# Patient Record
Sex: FEMALE | Race: WHITE | NOT HISPANIC OR LATINO | Employment: OTHER | URBAN - NONMETROPOLITAN AREA
[De-identification: names, ages, dates, MRNs, and addresses within clinical notes are randomized per-mention and may not be internally consistent; named-entity substitution may affect disease eponyms.]

---

## 2019-02-05 ENCOUNTER — OFFICE VISIT (OUTPATIENT)
Dept: CARDIOLOGY | Facility: CLINIC | Age: 69
End: 2019-02-05

## 2019-02-05 VITALS
HEART RATE: 118 BPM | SYSTOLIC BLOOD PRESSURE: 160 MMHG | WEIGHT: 172.2 LBS | BODY MASS INDEX: 25.51 KG/M2 | OXYGEN SATURATION: 98 % | HEIGHT: 69 IN | DIASTOLIC BLOOD PRESSURE: 108 MMHG

## 2019-02-05 DIAGNOSIS — R06.02 SOB (SHORTNESS OF BREATH): ICD-10-CM

## 2019-02-05 DIAGNOSIS — R00.0 TACHYCARDIA: ICD-10-CM

## 2019-02-05 DIAGNOSIS — I10 ESSENTIAL HYPERTENSION: Primary | ICD-10-CM

## 2019-02-05 DIAGNOSIS — R00.2 PALPITATIONS: ICD-10-CM

## 2019-02-05 PROCEDURE — 93000 ELECTROCARDIOGRAM COMPLETE: CPT | Performed by: PHYSICIAN ASSISTANT

## 2019-02-05 PROCEDURE — 99204 OFFICE O/P NEW MOD 45 MIN: CPT | Performed by: PHYSICIAN ASSISTANT

## 2019-02-05 RX ORDER — LISINOPRIL 20 MG/1
20 TABLET ORAL DAILY
COMMUNITY
End: 2019-03-07 | Stop reason: SDUPTHER

## 2019-02-05 RX ORDER — AMLODIPINE BESYLATE 10 MG/1
10 TABLET ORAL DAILY
COMMUNITY
End: 2019-03-07 | Stop reason: SDUPTHER

## 2019-02-05 RX ORDER — METOPROLOL TARTRATE 50 MG/1
50 TABLET, FILM COATED ORAL 2 TIMES DAILY
Qty: 180 TABLET | Refills: 3 | Status: SHIPPED | OUTPATIENT
Start: 2019-02-05 | End: 2020-03-16

## 2019-02-05 NOTE — PATIENT INSTRUCTIONS

## 2019-02-05 NOTE — PROGRESS NOTES
Subjective   Katharine Becerra is a 69 y.o. female     Chief Complaint   Patient presents with   • [facility] Care     patient appears in office today to [facility] cardiac care   • Hypertension       HPI  The patient presents today for initial evaluation.  She has a complex past medical history.  Her initial issues and complications started in roughly 2017.  At that time she was hospitalized at  in the setting of endometrial cancer.  She apparently had an episode of SVT at that time requiring, by description, adenosine.  She eventually had a stress test and an echo at that time which was unremarkable.  She had several other issues as well.  Also noted was a suspected her questionable DVT.  She apparently was treated with low molecular weight heparin but did not receive long-term anticoagulation otherwise.  She was placed on beta blocker because of her SVT.  She was then monitored as an outpatient with an event monitor.  That was unremarkable.  She has done well since.  Her biggest issue since has been recurrent hypertensive episodes.  She will have tachycardic episodes at that time as well.  She tells me that she has always been told that she was hypertensive and tachycardic.  She has had no evaluation for that.  She has been steadily increased and her antihypertensive regimen.  She now takes amlodipine, lisinopril, metoprolol.  She remains hypertensive and tachycardic, even today.  Apparently, previous labs including chemistry panel and thyroid studies have been unremarkable.  She did see her primary care provider recently where she was again hypertensive and tachycardic.  She has now been referred up to us for evaluation.  Symptomatically, she seems to do well.  She has dyspnea.  She has no chest pain.  She has no dizziness or syncope.  She has no further complaints otherwise.      Current Outpatient Medications   Medication Sig Dispense Refill   • amLODIPine (NORVASC) 10 MG tablet Take 10 mg by mouth Daily.     • lisinopril  (PRINIVIL,ZESTRIL) 20 MG tablet Take 20 mg by mouth Daily.     • metoprolol tartrate (LOPRESSOR) 50 MG tablet Take 1 tablet by mouth 2 (Two) Times a Day. 180 tablet 3     No current facility-administered medications for this visit.        Patient has no known allergies.    Past Medical History:   Diagnosis Date   • Cancer (CMS/HCC)     endometrial CA   • Hypertension    • Tachycardia        Social History     Socioeconomic History   • Marital status:      Spouse name: Not on file   • Number of children: Not on file   • Years of education: Not on file   • Highest education level: Not on file   Social Needs   • Financial resource strain: Not on file   • Food insecurity - worry: Not on file   • Food insecurity - inability: Not on file   • Transportation needs - medical: Not on file   • Transportation needs - non-medical: Not on file   Occupational History   • Not on file   Tobacco Use   • Smoking status: Never Smoker   • Smokeless tobacco: Never Used   Substance and Sexual Activity   • Alcohol use: No     Frequency: Never   • Drug use: No   • Sexual activity: Defer   Other Topics Concern   • Not on file   Social History Narrative   • Not on file       Family History   Problem Relation Age of Onset   • Cancer Mother    • Heart attack Father        Review of Systems   Constitutional: Negative.  Negative for fatigue.   HENT: Negative.  Negative for congestion, rhinorrhea and sneezing.    Eyes: Positive for visual disturbance (PRN ).   Respiratory: Positive for shortness of breath (SOA on exertion only). Negative for apnea, cough, chest tightness and wheezing.    Cardiovascular: Negative.  Negative for chest pain, palpitations and leg swelling.   Gastrointestinal: Negative.  Negative for abdominal pain, nausea and vomiting.   Endocrine: Negative.  Negative for cold intolerance and heat intolerance.   Genitourinary: Negative.  Negative for difficulty urinating, frequency and urgency.   Musculoskeletal: Negative.   "Negative for arthralgias, back pain, myalgias, neck pain and neck stiffness.   Skin: Negative.  Negative for rash and wound.   Allergic/Immunologic: Negative.  Negative for environmental allergies and food allergies.   Neurological: Negative.  Negative for dizziness, syncope, weakness, light-headedness and headaches.   Hematological: Negative.  Does not bruise/bleed easily.   Psychiatric/Behavioral: Negative.  Negative for agitation, confusion and sleep disturbance (denies waking up smothering/SOA). The patient is not nervous/anxious.        Objective     Vitals:    02/05/19 1450   BP: (!) 160/108   BP Location: Left arm   Patient Position: Sitting   Pulse: 118   SpO2: 98%   Weight: 78.1 kg (172 lb 3.2 oz)   Height: 175.3 cm (69\")        BP (!) 160/108 (BP Location: Left arm, Patient Position: Sitting)   Pulse 118   Ht 175.3 cm (69\")   Wt 78.1 kg (172 lb 3.2 oz)   SpO2 98%   BMI 25.43 kg/m²      Lab Results (most recent)     None          Physical Exam   Constitutional: She is oriented to person, place, and time. She appears well-developed and well-nourished. No distress.   HENT:   Head: Normocephalic and atraumatic.   Eyes: Conjunctivae and EOM are normal. Pupils are equal, round, and reactive to light.   Neck: Normal range of motion. Neck supple. No JVD present. No tracheal deviation present.   Cardiovascular: Normal rate, regular rhythm, normal heart sounds and intact distal pulses.   Pulmonary/Chest: Effort normal and breath sounds normal.   Abdominal: Soft. Bowel sounds are normal. She exhibits no distension and no mass. There is no tenderness. There is no rebound and no guarding.   Musculoskeletal: Normal range of motion. She exhibits no edema, tenderness or deformity.   Neurological: She is alert and oriented to person, place, and time.   Skin: Skin is warm and dry. No rash noted. No erythema. No pallor.   Psychiatric: She has a normal mood and affect. Her behavior is normal. Judgment and thought content " normal.   Nursing note and vitals reviewed.      Procedure     ECG 12 Lead  Date/Time: 2/5/2019 3:05 PM  Performed by: Thad Coyle PA  Authorized by: Thad Coyle PA   Comments: HTN    Probable sinus tachycardia at 113, no acute changes noted.                 Assessment/Plan      Diagnosis Plan   1. Essential hypertension  ECG 12 Lead    CT angiogram renal    Overnight Sleep Oximetry Study    Catecholamines, Fractionated, Urine, 24 Hour - Urine, Clean Catch    Metanephrines, Urine, 24 Hour - Urine, Clean Catch   2. SOB (shortness of breath)  CT angiogram renal    Overnight Sleep Oximetry Study    Catecholamines, Fractionated, Urine, 24 Hour - Urine, Clean Catch    Metanephrines, Urine, 24 Hour - Urine, Clean Catch   3. Tachycardia  CT angiogram renal    Overnight Sleep Oximetry Study    Catecholamines, Fractionated, Urine, 24 Hour - Urine, Clean Catch    Metanephrines, Urine, 24 Hour - Urine, Clean Catch   4. Palpitations       I feel the patient needs further evaluation for secondary causes of her hypertension.  Also with her tachycardia, she needs evaluation with 24 urine for catecholamines and metanephrines.  She will need a CTA of the renal arteries.  She will also be scheduled for overnight oximetry given her hypertensive excursions and intermittent fatigue otherwise.  I recommended her to consider an echo and an event monitor.  Because of studies were unremarkable within the past year and a half to 2 years to , she does not want to repeat those.  She had recent laboratories including chemistry panels and thyroid which apparently was unremarkable.  I will not repeat those.  Because of ongoing hypertension and tachycardia, I feel that we need to increase metoprolol to 50 mg twice a day.  I reviewed this with her.  She'll monitor heart rate and blood pressures very closely home and call us for any complications.  We can see her back after the above and recommend further at that time.  She will call  for any issues prior to follow-up.           Patient's Body mass index is 25.43 kg/m². BMI is above normal parameters. Recommendations include: educational material and referral to primary care.           Electronically signed by:

## 2019-03-05 ENCOUNTER — LAB (OUTPATIENT)
Dept: LAB | Facility: HOSPITAL | Age: 69
End: 2019-03-05

## 2019-03-05 DIAGNOSIS — R00.0 TACHYCARDIA: ICD-10-CM

## 2019-03-05 DIAGNOSIS — I10 ESSENTIAL HYPERTENSION: ICD-10-CM

## 2019-03-05 DIAGNOSIS — R06.02 SOB (SHORTNESS OF BREATH): ICD-10-CM

## 2019-03-05 PROCEDURE — 81050 URINALYSIS VOLUME MEASURE: CPT | Performed by: PHYSICIAN ASSISTANT

## 2019-03-05 PROCEDURE — 82384 ASSAY THREE CATECHOLAMINES: CPT | Performed by: PHYSICIAN ASSISTANT

## 2019-03-05 PROCEDURE — 83835 ASSAY OF METANEPHRINES: CPT | Performed by: PHYSICIAN ASSISTANT

## 2019-03-07 RX ORDER — LISINOPRIL 20 MG/1
20 TABLET ORAL DAILY
Qty: 30 TABLET | Refills: 6 | Status: SHIPPED | OUTPATIENT
Start: 2019-03-07 | End: 2019-10-21 | Stop reason: SDUPTHER

## 2019-03-07 RX ORDER — AMLODIPINE BESYLATE 10 MG/1
10 TABLET ORAL DAILY
Qty: 30 TABLET | Refills: 6 | Status: SHIPPED | OUTPATIENT
Start: 2019-03-07 | End: 2020-01-30 | Stop reason: SDUPTHER

## 2019-03-09 LAB
METANEPHS 24H UR-MRATE: 56 UG/24 HR (ref 45–290)
METANEPHS UR-MCNC: 34 UG/L
NORMETANEPHRINE 24H UR-MCNC: 248 UG/L
NORMETANEPHRINE 24H UR-MRATE: 409 UG/24 HR (ref 82–500)

## 2019-03-11 LAB
DOPAMINE 24H UR-MRATE: 155 UG/24 HR (ref 0–510)
DOPAMINE UR-MCNC: 91 UG/L
EPINEPH 24H UR-MRATE: 2 UG/24 HR (ref 0–20)
EPINEPH UR-MCNC: 1 UG/L
NOREPINEPH 24H UR-MRATE: 53 UG/24 HR (ref 0–135)
NOREPINEPH UR-MCNC: 31 UG/L

## 2019-07-30 ENCOUNTER — OFFICE VISIT (OUTPATIENT)
Dept: CARDIOLOGY | Facility: CLINIC | Age: 69
End: 2019-07-30

## 2019-07-30 VITALS
DIASTOLIC BLOOD PRESSURE: 77 MMHG | WEIGHT: 166.8 LBS | HEART RATE: 54 BPM | BODY MASS INDEX: 24.71 KG/M2 | HEIGHT: 69 IN | OXYGEN SATURATION: 98 % | SYSTOLIC BLOOD PRESSURE: 146 MMHG

## 2019-07-30 DIAGNOSIS — R06.02 SOB (SHORTNESS OF BREATH): Primary | ICD-10-CM

## 2019-07-30 DIAGNOSIS — I10 ESSENTIAL HYPERTENSION: ICD-10-CM

## 2019-07-30 DIAGNOSIS — R00.0 TACHYCARDIA: ICD-10-CM

## 2019-07-30 PROCEDURE — 99213 OFFICE O/P EST LOW 20 MIN: CPT | Performed by: PHYSICIAN ASSISTANT

## 2019-07-30 NOTE — PROGRESS NOTES
Problem list     Subjective   Katharine Becerra is a 69 y.o. female     Chief Complaint   Patient presents with   • Hypertension   • Rapid Heart Rate       HPI  The patient presents in today for follow-up.  We had seen her to establish care at last evaluation.  She had an extensive hospitalization at  previously as outlined in her initial note.  Irregardless, she was diagnosed with SVT at that time.  She started on low-dose beta-blocker.  She tells me that she had a stress test and an echo study which were unremarkable.  Because of ongoing hypertension and tachycardia we did see her to establish care at last evaluation.  We recommended consideration for repeat echo and event monitor.  As she had a previous study which were read as unremarkable including the echo and event monitor, she did not want to pursue that.  We increased metoprolol to 50 mg twice a day.  We recommended CTA of the renal arteries and also 24 urine for catecholamines/metanephrines.  She canceled the CTA of the renals and does not want to pursue that.  24 urine for catecholamines/metanephrines were normal.    Since being on metoprolol, blood pressures have basically normalized.  Heart rates have normalized as well.  She has had no further episodes which would suggest SVT.  Currently, the patient has no chest pain.  She has stable dyspnea.  She has no PND orthopnea.  Patient has no further complaints otherwise.    Current Outpatient Medications   Medication Sig Dispense Refill   • amLODIPine (NORVASC) 10 MG tablet Take 1 tablet by mouth Daily. 30 tablet 6   • lisinopril (PRINIVIL,ZESTRIL) 20 MG tablet Take 1 tablet by mouth Daily. 30 tablet 6   • metoprolol tartrate (LOPRESSOR) 50 MG tablet Take 1 tablet by mouth 2 (Two) Times a Day. 180 tablet 3     No current facility-administered medications for this visit.        Patient has no known allergies.    Past Medical History:   Diagnosis Date   • Cancer (CMS/HCC)     endometrial CA   • Hypertension    •  "Tachycardia        Social History     Socioeconomic History   • Marital status:      Spouse name: Not on file   • Number of children: Not on file   • Years of education: Not on file   • Highest education level: Not on file   Tobacco Use   • Smoking status: Never Smoker   • Smokeless tobacco: Never Used   Substance and Sexual Activity   • Alcohol use: No     Frequency: Never   • Drug use: No   • Sexual activity: Defer       Family History   Problem Relation Age of Onset   • Cancer Mother    • Heart attack Father        Review of Systems   Constitutional: Negative.  Negative for chills, fatigue and fever.   HENT: Negative.  Negative for congestion, rhinorrhea and sore throat.    Eyes: Positive for visual disturbance (glasses prn ).   Respiratory: Negative.  Negative for chest tightness and shortness of breath.    Cardiovascular: Positive for leg swelling (Some puffiness, but improved ). Negative for chest pain and palpitations.   Gastrointestinal: Negative.  Negative for abdominal pain, blood in stool, nausea and vomiting.   Endocrine: Negative.  Negative for cold intolerance and heat intolerance.   Genitourinary: Negative.  Negative for dysuria, frequency, hematuria and urgency.   Musculoskeletal: Negative.  Negative for arthralgias and back pain.   Skin: Negative.  Negative for rash and wound.   Allergic/Immunologic: Negative.  Negative for environmental allergies and food allergies.   Neurological: Negative.  Negative for dizziness, weakness and light-headedness.   Hematological: Negative.  Does not bruise/bleed easily.   Psychiatric/Behavioral: Negative.  Negative for agitation, confusion and sleep disturbance (Denies waking with smothering or SOA). The patient is not nervous/anxious.        Objective   Vitals:    07/30/19 1002   BP: 146/77   BP Location: Left arm   Patient Position: Sitting   Pulse: 54   SpO2: 98%   Weight: 75.7 kg (166 lb 12.8 oz)   Height: 175.3 cm (69\")      /77 (BP Location: Left " "arm, Patient Position: Sitting)   Pulse 54   Ht 175.3 cm (69\")   Wt 75.7 kg (166 lb 12.8 oz)   SpO2 98%   BMI 24.63 kg/m²    Lab Results (most recent)     None        Physical Exam   Constitutional: She is oriented to person, place, and time. She appears well-developed and well-nourished. No distress.   HENT:   Head: Normocephalic and atraumatic.   Eyes: Conjunctivae and EOM are normal. Pupils are equal, round, and reactive to light.   Neck: Normal range of motion. Neck supple. No JVD present. No tracheal deviation present.   Cardiovascular: Normal rate, regular rhythm, normal heart sounds and intact distal pulses.   Pulmonary/Chest: Effort normal and breath sounds normal.   Abdominal: Soft. Bowel sounds are normal. She exhibits no distension and no mass. There is no tenderness. There is no rebound and no guarding.   Musculoskeletal: Normal range of motion. She exhibits no edema, tenderness or deformity.   Neurological: She is alert and oriented to person, place, and time.   Skin: Skin is warm and dry. No rash noted. No erythema. No pallor.   Psychiatric: She has a normal mood and affect. Her behavior is normal. Judgment and thought content normal.   Nursing note and vitals reviewed.        Procedure   Procedures       Assessment/Plan      Diagnosis Plan   1. SOB (shortness of breath)     2. Essential hypertension     3. Tachycardia       1.  Previous work-up has been very much benign.  She tells me that her previous stress and echo studies were unremarkable.  Her dyspnea at this time seems to be at baseline.  She has had no further chest pain.  I feel no further cardiac work-up is warranted at this time.    2.  There is also reported history of SVT, the documented through [facility] records.  We have requested those.  At this time however I have increased metoprolol down to 50 mg twice a day.  With increasing the therapy, the patient reports that she is now normotensive for the most part has had no further episodes of " tachycardia/palpitations.  She certainly has nothing to suggest SVT.  We did discuss physical maneuvers and lifestyle modifications otherwise given questionable history of SVT.    3.  Her 24 urine for catecholamines and metanephrines were unremarkable.  We had recommended CTA of the renals.  The patient canceled that he does not want to pursue that.  As she has responded so well to the increase of metoprolol therapy and is now normotensive, I feel that we can hold on that for now.  We can revisit that in the future if felt applicable.    4.  In the setting, with benign work-up and now stable setting particularly with regards to heart rate blood pressures, I feel nothing further is indicated.  We will continue to see this patient on 6-month intervals.  She will call for any issues prior to follow-up.         Patient's Body mass index is 24.63 kg/m². BMI is within normal parameters. No follow-up required..             Electronically signed by:

## 2019-10-21 DIAGNOSIS — I10 ESSENTIAL HYPERTENSION: Primary | ICD-10-CM

## 2019-10-21 RX ORDER — LISINOPRIL 20 MG/1
20 TABLET ORAL DAILY
Qty: 30 TABLET | Refills: 5 | Status: SHIPPED | OUTPATIENT
Start: 2019-10-21 | End: 2020-01-30 | Stop reason: SDUPTHER

## 2020-01-30 ENCOUNTER — OFFICE VISIT (OUTPATIENT)
Dept: CARDIOLOGY | Facility: CLINIC | Age: 70
End: 2020-01-30

## 2020-01-30 VITALS
WEIGHT: 165 LBS | BODY MASS INDEX: 24.44 KG/M2 | OXYGEN SATURATION: 99 % | HEIGHT: 69 IN | HEART RATE: 60 BPM | SYSTOLIC BLOOD PRESSURE: 153 MMHG | DIASTOLIC BLOOD PRESSURE: 73 MMHG

## 2020-01-30 DIAGNOSIS — I10 ESSENTIAL HYPERTENSION: ICD-10-CM

## 2020-01-30 DIAGNOSIS — R00.2 PALPITATIONS: ICD-10-CM

## 2020-01-30 DIAGNOSIS — R00.0 TACHYCARDIA: ICD-10-CM

## 2020-01-30 DIAGNOSIS — R06.02 SOB (SHORTNESS OF BREATH): ICD-10-CM

## 2020-01-30 PROCEDURE — 99213 OFFICE O/P EST LOW 20 MIN: CPT | Performed by: PHYSICIAN ASSISTANT

## 2020-01-30 PROCEDURE — 93000 ELECTROCARDIOGRAM COMPLETE: CPT | Performed by: PHYSICIAN ASSISTANT

## 2020-01-30 RX ORDER — LISINOPRIL 20 MG/1
20 TABLET ORAL DAILY
Qty: 90 TABLET | Refills: 3 | Status: SHIPPED | OUTPATIENT
Start: 2020-01-30 | End: 2020-07-29 | Stop reason: SDUPTHER

## 2020-01-30 RX ORDER — AMLODIPINE BESYLATE 10 MG/1
10 TABLET ORAL DAILY
Qty: 90 TABLET | Refills: 3 | Status: SHIPPED | OUTPATIENT
Start: 2020-01-30 | End: 2020-07-29 | Stop reason: SDUPTHER

## 2020-01-30 NOTE — PROGRESS NOTES
Problem list     Subjective   Katharine Becerra is a 70 y.o. female     Chief Complaint   Patient presents with   • Hypertension     Here for a follow up        HPI  The patient presents in today to follow-up routinely.  The patient apparently has had a history of SVT in the past.  That has been treated with metoprolol therapy.  We have slowly titrated that regimen up for treatment of palpitations/tachycardia but also for additional blood pressure control.  Per report, the patient had a previous evaluation through [facility] with a stress and an echo.  Apparently there was an evaluation shortly thereafter with an event monitor.  Those studies were all benign.  The patient really has done well since her last evaluation here.  Blood pressures been mostly controlled.  She reports no dysrhythmic symptoms at this time.  The patient apparently had a syncopal episode back in October which prompted evaluation in the emergency room.  This was felt to be related to heat related illness.  Cardiac evaluation at that time apparently was benign by her report including cardiac enzymes, EKG, etc.  Currently, the patient has no chest pain.  She has stable dyspnea.  She has no failure dysrhythmic symptoms.  The patient has no further complaints otherwise.    Current Outpatient Medications on File Prior to Visit   Medication Sig Dispense Refill   • metoprolol tartrate (LOPRESSOR) 50 MG tablet Take 1 tablet by mouth 2 (Two) Times a Day. 180 tablet 3   • [DISCONTINUED] amLODIPine (NORVASC) 10 MG tablet Take 1 tablet by mouth Daily. 30 tablet 6   • [DISCONTINUED] lisinopril (PRINIVIL,ZESTRIL) 20 MG tablet Take 1 tablet by mouth Daily. 30 tablet 5     No current facility-administered medications on file prior to visit.        Patient has no known allergies.    Past Medical History:   Diagnosis Date   • Cancer (CMS/HCC)     endometrial CA   • Hypertension    • Tachycardia        Social History     Socioeconomic History   •  "Marital status:      Spouse name: Not on file   • Number of children: Not on file   • Years of education: Not on file   • Highest education level: Not on file   Tobacco Use   • Smoking status: Never Smoker   • Smokeless tobacco: Never Used   Substance and Sexual Activity   • Alcohol use: No     Frequency: Never   • Drug use: No   • Sexual activity: Defer       Family History   Problem Relation Age of Onset   • Cancer Mother    • Heart attack Father        Review of Systems   Constitutional: Negative.  Negative for fatigue.   HENT: Negative.  Negative for congestion, rhinorrhea and sore throat.    Eyes: Positive for visual disturbance (wears glasses PRN).   Respiratory: Negative.  Negative for chest tightness, shortness of breath and wheezing.    Cardiovascular: Negative.  Negative for chest pain, palpitations and leg swelling.   Gastrointestinal: Negative.  Negative for abdominal pain, nausea and vomiting.   Endocrine: Negative.  Negative for cold intolerance and heat intolerance.   Genitourinary: Negative.  Negative for difficulty urinating, frequency and urgency.   Musculoskeletal: Negative.  Negative for arthralgias, back pain and neck pain.   Skin: Negative.  Negative for rash and wound.   Allergic/Immunologic: Negative.  Negative for environmental allergies and food allergies.   Neurological: Positive for dizziness (dizziness upon standing, with exerion, H/O high BP) and syncope (passed out after getting dizzy doing yard work). Negative for light-headedness.   Hematological: Negative.  Does not bruise/bleed easily.   Psychiatric/Behavioral: Negative.  Negative for agitation, confusion and sleep disturbance (denies waking up smothering/SOA). The patient is not nervous/anxious.        Objective   Vitals:    01/30/20 0958   BP: 153/73   BP Location: Left arm   Patient Position: Sitting   Pulse: 60   SpO2: 99%   Weight: 74.8 kg (165 lb)   Height: 175.3 cm (69\")      /73 (BP Location: Left arm, Patient " "Position: Sitting)   Pulse 60   Ht 175.3 cm (69\")   Wt 74.8 kg (165 lb)   SpO2 99%   BMI 24.37 kg/m²    Lab Results (most recent)     None        Physical Exam   Constitutional: She is oriented to person, place, and time. She appears well-developed and well-nourished. No distress.   HENT:   Head: Normocephalic and atraumatic.   Eyes: Pupils are equal, round, and reactive to light. Conjunctivae and EOM are normal.   Neck: Normal range of motion. Neck supple. No JVD present. No tracheal deviation present.   Cardiovascular: Normal rate, regular rhythm, normal heart sounds and intact distal pulses.   Pulmonary/Chest: Effort normal and breath sounds normal.   Abdominal: Soft. Bowel sounds are normal. She exhibits no distension and no mass. There is no tenderness. There is no rebound and no guarding.   Musculoskeletal: Normal range of motion. She exhibits no edema, tenderness or deformity.   Neurological: She is alert and oriented to person, place, and time.   Skin: Skin is warm and dry. No rash noted. No erythema. No pallor.   Psychiatric: She has a normal mood and affect. Her behavior is normal. Judgment and thought content normal.   Nursing note and vitals reviewed.        Procedure     ECG 12 Lead  Date/Time: 1/30/2020 10:15 AM  Performed by: Thad Coyle PA  Authorized by: Thad Coyle PA   Comparison: compared with previous ECG from 2/6/2019  Comparison to previous ECG: Sinus arrhythmia, rate 55, probable left axis deviation, minor nonspecific ST and T wave changes, no acute changes noted.                 Assessment/Plan      Diagnosis Plan   1. SOB (shortness of breath)     2. Essential hypertension  ECG 12 Lead    lisinopril (PRINIVIL,ZESTRIL) 20 MG tablet   3. Tachycardia     4. Palpitations       1.  At this time, the patient is doing well.  She has no dysrhythmic symptoms basically since starting beta-blocker therapy.  She is on appropriate antihypertensive regimen otherwise with appropriate " treatment of blood pressures.    2.  I would continue medical regimen without change as she reports stability on that regimen.  We did give her refills of amlodipine and lisinopril.  That has been sent to her pharmacy.    3.  Previous work-up, as above, has been benign from cardiac standpoint.  She is overall is clinically stable.  I do not feel anything further would be indicated from cardiovascular standpoint.  We will continue to see her on 6-month intervals at this time.  She will report back immediately for complications.             Patient's Body mass index is 24.37 kg/m². BMI is within normal parameters. No follow-up required..             Electronically signed by:

## 2020-03-16 RX ORDER — METOPROLOL TARTRATE 50 MG/1
TABLET, FILM COATED ORAL
Qty: 180 TABLET | Refills: 0 | Status: SHIPPED | OUTPATIENT
Start: 2020-03-16 | End: 2020-07-29 | Stop reason: SDUPTHER

## 2020-07-29 ENCOUNTER — TELEPHONE (OUTPATIENT)
Dept: CARDIOLOGY | Facility: CLINIC | Age: 70
End: 2020-07-29

## 2020-07-29 DIAGNOSIS — I10 ESSENTIAL HYPERTENSION: ICD-10-CM

## 2020-07-29 RX ORDER — LISINOPRIL 20 MG/1
20 TABLET ORAL DAILY
Qty: 90 TABLET | Refills: 3 | Status: SHIPPED | OUTPATIENT
Start: 2020-07-29 | End: 2021-03-02 | Stop reason: SDUPTHER

## 2020-07-29 RX ORDER — AMLODIPINE BESYLATE 10 MG/1
10 TABLET ORAL DAILY
Qty: 90 TABLET | Refills: 3 | Status: SHIPPED | OUTPATIENT
Start: 2020-07-29 | End: 2021-03-02

## 2020-07-29 RX ORDER — METOPROLOL TARTRATE 50 MG/1
50 TABLET, FILM COATED ORAL 2 TIMES DAILY
Qty: 180 TABLET | Refills: 0 | Status: SHIPPED | OUTPATIENT
Start: 2020-07-29 | End: 2021-03-02 | Stop reason: SDUPTHER

## 2020-07-29 NOTE — TELEPHONE ENCOUNTER
----- Message from Roselyn Zuniga sent at 7/29/2020 11:04 AM EDT -----  PLEASE CALL IN PTS HEART MEDICATIONS

## 2021-03-02 ENCOUNTER — OFFICE VISIT (OUTPATIENT)
Dept: CARDIOLOGY | Facility: CLINIC | Age: 71
End: 2021-03-02

## 2021-03-02 VITALS
SYSTOLIC BLOOD PRESSURE: 160 MMHG | WEIGHT: 163.6 LBS | HEART RATE: 134 BPM | OXYGEN SATURATION: 100 % | HEIGHT: 69 IN | DIASTOLIC BLOOD PRESSURE: 118 MMHG | BODY MASS INDEX: 24.23 KG/M2 | TEMPERATURE: 96.7 F

## 2021-03-02 DIAGNOSIS — I48.19 PERSISTENT ATRIAL FIBRILLATION (HCC): Primary | ICD-10-CM

## 2021-03-02 DIAGNOSIS — R42 DIZZINESS: ICD-10-CM

## 2021-03-02 DIAGNOSIS — I10 ESSENTIAL HYPERTENSION: ICD-10-CM

## 2021-03-02 DIAGNOSIS — R06.02 SOB (SHORTNESS OF BREATH): ICD-10-CM

## 2021-03-02 DIAGNOSIS — R00.0 TACHYCARDIA: ICD-10-CM

## 2021-03-02 PROCEDURE — 99214 OFFICE O/P EST MOD 30 MIN: CPT | Performed by: PHYSICIAN ASSISTANT

## 2021-03-02 PROCEDURE — 93000 ELECTROCARDIOGRAM COMPLETE: CPT | Performed by: PHYSICIAN ASSISTANT

## 2021-03-02 RX ORDER — ASPIRIN 81 MG/1
81 TABLET ORAL DAILY
COMMUNITY
End: 2022-03-25

## 2021-03-02 NOTE — PROGRESS NOTES
Problem list     Subjective   Katharine Becerra is a 71 y.o. female     Chief Complaint   Patient presents with   • Cerebrovascular Accident     presents as hospital f/u  for CVA, s/p Covid   • Atrial Fibrillation   • Hypertension   • Shortness of Breath       HPI  The patient presents into the clinic today for routine follow-up, status post recent admission to  hospital.  She was transferred to  hospital with presumed CVA process.  Upon evaluation at , the patient was administered tPA.  Work-up initially suggested CT which was unremarkable.  CTA of the head and neck was unremarkable as well.  Her echocardiogram at that time indicated preserved systolic function with questionable inferior wall akinesis.  Eventually, the patient was diagnosed with left MCA ischemic stroke felt secondary to atrial fibrillation which was new onset at that time.  She eventually was placed in short rehab admission and started on Eliquis at that time.  It is also noted that the patient was Covid positive at time of admission.  She has now been discharged for outpatient follow-up and presents today in that setting.    Unfortunately, the patient discontinued Eliquis because of cost.  She has continued aspirin and medical regimen otherwise as outlined below.  She still has recurrent episodes of tachycardia felt secondary to A. fib/rapid ventricular response rates.  The patient reports no chest pain.  She has stable dyspnea.  She has no failure symptoms.  She has no further complaints.    Patient has no known allergies.    Past Medical History:   Diagnosis Date   • Atrial fibrillation (CMS/HCC)    • Cancer (CMS/HCC)     endometrial CA   • COVID-19 01/2021   • Hypertension    • Stroke (CMS/HCC) 01/2021   • Tachycardia        Social History     Socioeconomic History   • Marital status:      Spouse name: Not on file   • Number of children: Not on file   • Years of education: Not on file   • Highest education level: Not on file   Tobacco  "Use   • Smoking status: Never Smoker   • Smokeless tobacco: Never Used   Substance and Sexual Activity   • Alcohol use: No   • Drug use: No   • Sexual activity: Defer       Family History   Problem Relation Age of Onset   • Cancer Mother    • Heart attack Father        Review of Systems   Constitutional: Positive for fatigue. Negative for chills, diaphoresis and fever.   HENT: Positive for trouble swallowing.    Eyes: Negative.    Respiratory: Positive for shortness of breath (with daily activity and times at rest) and wheezing. Negative for apnea, cough and chest tightness.    Cardiovascular: Negative.  Negative for chest pain, palpitations and leg swelling.   Gastrointestinal: Negative.  Negative for abdominal pain, blood in stool, constipation, diarrhea, nausea and vomiting.   Endocrine: Negative.    Genitourinary: Negative.  Negative for hematuria.   Musculoskeletal: Negative.  Negative for arthralgias, back pain, myalgias and neck pain.   Skin: Negative.    Allergic/Immunologic: Negative for environmental allergies and food allergies.   Neurological: Positive for speech difficulty (stroke 1/2021) and weakness (R side due to stroke). Negative for dizziness, light-headedness, numbness and headaches.   Hematological: Negative.  Does not bruise/bleed easily.   Psychiatric/Behavioral: Positive for confusion and sleep disturbance (trouble taking a deep breath, insomnia). Negative for agitation. The patient is nervous/anxious.        Objective   Vitals:    03/02/21 0936   BP: (!) 160/118   BP Location: Left arm   Patient Position: Sitting   Pulse: (!) 134   Temp: 96.7 °F (35.9 °C)   SpO2: 100%   Weight: 74.2 kg (163 lb 9.6 oz)   Height: 175.3 cm (69\")      BP (!) 160/118 (BP Location: Left arm, Patient Position: Sitting)   Pulse (!) 134   Temp 96.7 °F (35.9 °C)   Ht 175.3 cm (69\")   Wt 74.2 kg (163 lb 9.6 oz)   SpO2 100%   BMI 24.16 kg/m²    Lab Results (most recent)     None        Physical Exam  Vitals and " nursing note reviewed.   Constitutional:       General: She is not in acute distress.     Appearance: She is well-developed.   HENT:      Head: Normocephalic and atraumatic.   Eyes:      Conjunctiva/sclera: Conjunctivae normal.      Pupils: Pupils are equal, round, and reactive to light.   Neck:      Vascular: No JVD.      Trachea: No tracheal deviation.   Cardiovascular:      Rate and Rhythm: Tachycardia present. Rhythm irregularly irregular.      Heart sounds: Normal heart sounds.   Pulmonary:      Effort: Pulmonary effort is normal.      Breath sounds: Normal breath sounds.   Abdominal:      General: Bowel sounds are normal. There is no distension.      Palpations: Abdomen is soft. There is no mass.      Tenderness: There is no abdominal tenderness. There is no guarding or rebound.   Musculoskeletal:         General: No tenderness or deformity. Normal range of motion.      Cervical back: Normal range of motion and neck supple.   Skin:     General: Skin is warm and dry.      Coloration: Skin is not pale.      Findings: No erythema or rash.   Neurological:      Mental Status: She is alert and oriented to person, place, and time.   Psychiatric:         Behavior: Behavior normal.         Thought Content: Thought content normal.         Judgment: Judgment normal.           Procedure     ECG 12 Lead    Date/Time: 3/2/2021 9:42 AM  Performed by: Thad Coyle PA  Authorized by: Thad Coyle PA   Comparison: compared with previous ECG from 1/5/2021  Comparison to previous ECG: Atrial fibrillation with a rate of 125 beats a minute, nonspecific ST-T wave changes, no acute changes noted.                 Assessment/Plan      Diagnosis Plan   1. Persistent atrial fibrillation (CMS/HCC)  Protime-INR Coumadin YES  (Warfarin) Therapy    Cardiac Event Monitor   2. Essential hypertension  ECG 12 Lead    lisinopril (PRINIVIL,ZESTRIL) 30 MG tablet   3. SOB (shortness of breath)  ECG 12 Lead    Cardiac Event Monitor   4.  Tachycardia  ECG 12 Lead    Cardiac Event Monitor   5. Dizziness  Cardiac Event Monitor     1.  The patient has A. fib and has had subsequent stroke secondary to the same.  She was recently admitted to , all as outlined above.  Unfortunately, the patient has discontinued Eliquis because of cost.  We have discussed in great detail, the importance of anticoagulation today.  She cannot afford Eliquis or the other newer anticoagulant agents, but she does agree to start warfarin.  We will start warfarin at 5 mg 1 tab daily.    2.  With institution of warfarin, we have given the patient a standing order for PT/INR is to take to her local laboratory.    3.  We have discussed in detail dietary changes/Coumadin diet.  We have given her a handout regarding Coumadin, importance of laboratories to ensure therapeutic levels, diet, etc.    4.  She is tachycardic today.  This represents rapid ventricular response rates.  She again was encouraged to take beta-blocker as previously prescribed at .  Apparently there was question that she has missed some dosings.  She will restart that as previously administered and prescribed.    5.  I will schedule for an event monitor so that we can follow rate and rhythm very closely.  We can modify medical regimen further at that time if needed.    6.  She will continue her medical regimen otherwise.  Eventually, I would like to see the patient back and discuss consideration for stress testing.  She has new onset A. fib and inferior akinesis by echo performed through .  I do feel that ischemia assessment is warranted, however as the patient has some mild deficits post CVA, she would prefer to hold on that for now.    7.  For change in clinical course, she will call to us immediately.  We will follow her closely with regards to PT/INR levels and Coumadin otherwise.  She will call immediately for complications.           Katharine Becerra  reports that she has never smoked. She has never used  smokeless tobacco..         Patient's Body mass index is 24.16 kg/m². BMI is within normal parameters. No follow-up required..     Advance Care Planning   ACP discussion was held with the patient during this visit. Patient does not have an advance directive, declines further assistance.        Electronically signed by:

## 2021-03-03 NOTE — TELEPHONE ENCOUNTER
Has enough for today - asking for refills to be sent in.    Ibis is working on this      AT [facility]

## 2021-03-04 RX ORDER — WARFARIN SODIUM 5 MG/1
5 TABLET ORAL
Qty: 30 TABLET | Refills: 11 | Status: SHIPPED | OUTPATIENT
Start: 2021-03-04 | End: 2022-03-14

## 2021-03-04 RX ORDER — LISINOPRIL 30 MG/1
30 TABLET ORAL DAILY
Qty: 90 TABLET | Refills: 3 | Status: SHIPPED | OUTPATIENT
Start: 2021-03-04 | End: 2021-07-19

## 2021-03-04 RX ORDER — METOPROLOL TARTRATE 50 MG/1
75 TABLET, FILM COATED ORAL 2 TIMES DAILY
Qty: 270 TABLET | Refills: 3 | Status: SHIPPED | OUTPATIENT
Start: 2021-03-04 | End: 2022-03-14

## 2021-03-09 ENCOUNTER — TELEPHONE (OUTPATIENT)
Dept: CARDIOLOGY | Facility: CLINIC | Age: 71
End: 2021-03-09

## 2021-03-09 NOTE — TELEPHONE ENCOUNTER
Patients son called to give updated - Dr Kulkarni  , wanting x-ray of lungs ( patient taking order to [facility] tomorrow )      was given prednisone for 5 days and was given 2 inhalers  1 for daily use and 1 albuterol inhaler  for emergency ,     patient is feeling ok but having issue breathing and sleeping due to not breathing good.       Son said `s office said they will be faxing notes to us.       AT [facility]       Spoke to Thad NELSON - Reviewed note from Dr. Kulkarni - Thad starting Lasix 40 mg 1 po daily for 3 days , then PRN also Potassium 10 mg 1 po daily for 3 days and then PRN -- Potassium must be taken when ever Lasix is taken.       Spoke to patients son , he verbalized understanding AT [facility]

## 2021-03-09 NOTE — TELEPHONE ENCOUNTER
Per salvador pt to call after seeing dr orozco today with her symptoms and let him know whats going on.

## 2021-03-10 RX ORDER — FUROSEMIDE 40 MG/1
40 TABLET ORAL DAILY PRN
Qty: 90 TABLET | Refills: 3 | Status: SHIPPED | OUTPATIENT
Start: 2021-03-10

## 2021-03-10 RX ORDER — POTASSIUM CHLORIDE 750 MG/1
10 TABLET, FILM COATED, EXTENDED RELEASE ORAL DAILY PRN
Qty: 90 TABLET | Refills: 3 | Status: SHIPPED | OUTPATIENT
Start: 2021-03-10

## 2021-04-05 ENCOUNTER — LAB (OUTPATIENT)
Dept: CARDIOLOGY | Facility: CLINIC | Age: 71
End: 2021-04-05

## 2021-04-05 ENCOUNTER — OFFICE VISIT (OUTPATIENT)
Dept: CARDIOLOGY | Facility: CLINIC | Age: 71
End: 2021-04-05

## 2021-04-05 VITALS
BODY MASS INDEX: 24.91 KG/M2 | OXYGEN SATURATION: 98 % | HEART RATE: 125 BPM | DIASTOLIC BLOOD PRESSURE: 104 MMHG | WEIGHT: 168.2 LBS | SYSTOLIC BLOOD PRESSURE: 149 MMHG | HEIGHT: 69 IN

## 2021-04-05 DIAGNOSIS — I10 ESSENTIAL HYPERTENSION: ICD-10-CM

## 2021-04-05 DIAGNOSIS — I48.0 PAROXYSMAL ATRIAL FIBRILLATION (HCC): ICD-10-CM

## 2021-04-05 DIAGNOSIS — R00.0 TACHYCARDIA: ICD-10-CM

## 2021-04-05 DIAGNOSIS — I48.19 PERSISTENT ATRIAL FIBRILLATION (HCC): ICD-10-CM

## 2021-04-05 DIAGNOSIS — R06.02 SOB (SHORTNESS OF BREATH): Primary | ICD-10-CM

## 2021-04-05 LAB
INR PPP: 2.44 (ref 0.9–1.1)
INR PPP: 2.44 (ref 2–3)
PROTHROMBIN TIME: 26.4 SECONDS (ref 11.9–14.1)

## 2021-04-05 PROCEDURE — 85610 PROTHROMBIN TIME: CPT | Performed by: PHYSICIAN ASSISTANT

## 2021-04-05 PROCEDURE — 36415 COLL VENOUS BLD VENIPUNCTURE: CPT

## 2021-04-05 PROCEDURE — 99214 OFFICE O/P EST MOD 30 MIN: CPT | Performed by: PHYSICIAN ASSISTANT

## 2021-04-05 RX ORDER — POTASSIUM CHLORIDE 750 MG/1
10 TABLET, FILM COATED, EXTENDED RELEASE ORAL DAILY PRN
Qty: 90 TABLET | Refills: 3 | Status: CANCELLED | OUTPATIENT
Start: 2021-04-05

## 2021-04-05 RX ORDER — DIGOXIN 125 MCG
125 TABLET ORAL DAILY
Qty: 30 TABLET | Refills: 11 | Status: SHIPPED | OUTPATIENT
Start: 2021-04-05 | End: 2022-04-11

## 2021-04-05 RX ORDER — FUROSEMIDE 40 MG/1
40 TABLET ORAL DAILY PRN
Qty: 90 TABLET | Refills: 3 | Status: CANCELLED | OUTPATIENT
Start: 2021-04-05

## 2021-04-05 RX ORDER — FUROSEMIDE 40 MG/1
40 TABLET ORAL DAILY
Qty: 30 TABLET | Refills: 11 | Status: SHIPPED | OUTPATIENT
Start: 2021-04-05

## 2021-04-05 RX ORDER — DIGOXIN 125 MCG
125 TABLET ORAL DAILY
Qty: 30 TABLET | Refills: 1 | Status: CANCELLED | OUTPATIENT
Start: 2021-04-05

## 2021-04-05 RX ORDER — POTASSIUM CHLORIDE 750 MG/1
10 TABLET, FILM COATED, EXTENDED RELEASE ORAL DAILY
Qty: 30 TABLET | Refills: 11 | Status: SHIPPED | OUTPATIENT
Start: 2021-04-05 | End: 2022-03-25

## 2021-04-05 NOTE — PROGRESS NOTES
Problem list     Subjective   Katharine Becerra is a 71 y.o. female     Chief Complaint   Patient presents with   • Follow-up       HPI  The patient presents into the clinic today for routine follow-up, status post recent admission to  hospital.  She was transferred to  hospital with presumed CVA process.  Upon evaluation at , the patient was administered tPA.  Work-up initially suggested CT which was unremarkable.  CTA of the head and neck was unremarkable as well.  Her echocardiogram at that time indicated preserved systolic function with questionable inferior wall akinesis.  Eventually, the patient was diagnosed with left MCA ischemic stroke felt secondary to atrial fibrillation which was new onset at that time.  She eventually was placed in short rehab admission and started on Eliquis at that time.  It is also noted that the patient was Covid positive at time of admission.    We have been following the patient in that regard since.  At last evaluation, she was started on warfarin as she cannot afford Eliquis as recommended to .  She was also placed on an event monitor for which we wanted to see her today to discuss.  Event monitor indicated A. fib with recurrent rapid ventricular response rates.  She does tell me she is noncompliant with metoprolol.  Despite that, she remains tachycardic.  She has no chest pain.  She has stable dyspnea.  She is hypertensive today but the son reports that she has not taken her metoprolol nor lisinopril today.  We again encourage compliance and then she will monitor blood pressure and heart rates closely at home in that setting.    Current Outpatient Medications on File Prior to Visit   Medication Sig Dispense Refill   • aspirin (aspirin) 81 MG EC tablet Take 81 mg by mouth Daily.     • lisinopril (PRINIVIL,ZESTRIL) 30 MG tablet Take 1 tablet by mouth Daily. 90 tablet 3   • metoprolol tartrate (LOPRESSOR) 50 MG tablet Take 1.5 tablets by mouth 2 (Two) Times a Day. 270 tablet 3    • warfarin (COUMADIN) 5 MG tablet Take 1 tablet by mouth Daily. 30 tablet 11   • furosemide (LASIX) 40 MG tablet Take 1 tablet by mouth Daily As Needed (for Edema). 90 tablet 3   • potassium chloride 10 MEQ CR tablet Take 1 tablet by mouth Daily As Needed (take with Lasix). 90 tablet 3     No current facility-administered medications on file prior to visit.       Patient has no known allergies.    Past Medical History:   Diagnosis Date   • Atrial fibrillation (CMS/HCC)    • Cancer (CMS/HCC)     endometrial CA   • COVID-19 01/2021   • Hypertension    • Stroke (CMS/HCC) 01/2021   • Tachycardia        Social History     Socioeconomic History   • Marital status:      Spouse name: Not on file   • Number of children: Not on file   • Years of education: Not on file   • Highest education level: Not on file   Tobacco Use   • Smoking status: Never Smoker   • Smokeless tobacco: Never Used   Substance and Sexual Activity   • Alcohol use: No   • Drug use: No   • Sexual activity: Defer       Family History   Problem Relation Age of Onset   • Cancer Mother    • Heart attack Father        Review of Systems   Constitutional: Positive for fatigue. Negative for chills and fever.   HENT: Positive for rhinorrhea. Negative for congestion and sore throat.    Eyes: Positive for visual disturbance (reading glasses).   Respiratory: Positive for shortness of breath. Negative for chest tightness and wheezing.    Cardiovascular: Positive for leg swelling. Negative for chest pain and palpitations.   Gastrointestinal: Negative.    Endocrine: Negative.  Negative for cold intolerance.   Genitourinary: Negative.    Musculoskeletal: Negative.  Negative for arthralgias, back pain and neck pain.   Skin: Positive for wound (b/l top of feet ). Negative for rash.   Allergic/Immunologic: Negative.  Negative for environmental allergies.   Neurological: Positive for weakness. Negative for dizziness, numbness and headaches.   Hematological:  "Bruises/bleeds easily (bruises/ bleeds).   Psychiatric/Behavioral: Positive for sleep disturbance (falling and staying ).       Objective   Vitals:    21 1400   BP: (!) 149/104   BP Location: Left arm   Patient Position: Sitting   Pulse: (!) 125   SpO2: 98%   Weight: 76.3 kg (168 lb 3.2 oz)   Height: 175.3 cm (69\")      BP (!) 149/104 (BP Location: Left arm, Patient Position: Sitting)   Pulse (!) 125   Ht 175.3 cm (69\")   Wt 76.3 kg (168 lb 3.2 oz)   SpO2 98%   BMI 24.84 kg/m²    Lab Results (most recent)     None        Physical Exam  Vitals and nursing note reviewed.   Constitutional:       General: She is not in acute distress.     Appearance: She is well-developed.   HENT:      Head: Normocephalic and atraumatic.   Eyes:      Conjunctiva/sclera: Conjunctivae normal.      Pupils: Pupils are equal, round, and reactive to light.   Neck:      Vascular: No JVD.      Trachea: No tracheal deviation.   Cardiovascular:      Rate and Rhythm: Normal rate. Rhythm irregularly irregular.      Heart sounds: Murmur heard.   Systolic murmur is present with a grade of 1/6.     Pulmonary:      Effort: Pulmonary effort is normal.      Breath sounds: Normal breath sounds.   Abdominal:      General: Bowel sounds are normal. There is no distension.      Palpations: Abdomen is soft. There is no mass.      Tenderness: There is no abdominal tenderness. There is no guarding or rebound.   Musculoskeletal:         General: No tenderness or deformity. Normal range of motion.      Cervical back: Normal range of motion and neck supple.      Right lower le+ Edema present.      Left lower le+ Edema present.   Skin:     General: Skin is warm and dry.      Coloration: Skin is not pale.      Findings: No erythema or rash.   Neurological:      Mental Status: She is alert and oriented to person, place, and time.   Psychiatric:         Behavior: Behavior normal.         Thought Content: Thought content normal.         Judgment: " Judgment normal.           Procedure   Procedures       Assessment/Plan      Diagnosis Plan   1. SOB (shortness of breath)  Digoxin Level   2. Tachycardia  Digoxin Level   3. Essential hypertension  Digoxin Level   4. Paroxysmal atrial fibrillation (CMS/HCC)  Digoxin Level     1.  The patient still has A. fib with recurrent episodes of rapid ventricular response rates.  I have again encouraged compliance with metoprolol, which she currently takes at 75 mg twice a day.  I will also add digoxin 0.125 mg 1 tab daily.  Hopefully this will improve rate control.    2.  She will need digoxin level performed in 7 to 10 days after institution of digoxin.    3.  The patient has not had her PT/INR performed since being on Coumadin which was started about a week ago.  We recommended her that she start that at last evaluation but she never did, at least not up to a week ago.  I will get a PT/INR level today and advise the patient once I can review that.  She already has a standing order which was given to her for PT/INRs at last evaluation.  Unfortunately, the patient has not had it performed as recommended.    4.  The patient has significant bilateral lower extremity edema.  She never picked up her Lasix as recommended at last evaluation.  I will now send a another prescription for that back to her pharmacy at Lasix 40 mg with potassium 10 mEq to be taken when she does take Lasix.  We reviewed with her when to take that and how to titrate that if needed at home.    5.  We will see her routinely through the clinic.  Eventually, she will need consideration for repeat ischemia assessment as her previous echo did support possible wall motion abnormality.  This was performed in the setting of new onset atrial fibrillation.  I would like to stabilize the patient's blood pressures, rates in A. fib, etc. before we schedule for stress testing.  We will see her back routinely and discuss this in the future.         Katharine Becerra  reports  that she has never smoked. She has never used smokeless tobacco.          Patient's Body mass index is 24.84 kg/m². BMI is within normal parameters. No follow-up required..             Electronically signed by:

## 2021-04-05 NOTE — PATIENT INSTRUCTIONS

## 2021-04-07 ENCOUNTER — TELEPHONE (OUTPATIENT)
Dept: CARDIOLOGY | Facility: CLINIC | Age: 71
End: 2021-04-07

## 2021-04-07 ENCOUNTER — ANTICOAGULATION VISIT (OUTPATIENT)
Dept: CARDIOLOGY | Facility: CLINIC | Age: 71
End: 2021-04-07

## 2021-04-07 NOTE — TELEPHONE ENCOUNTER
INR 2.44 DRAWN Monday. STARTED COUMADIN APPROX. LAST WED.NO DOSAGE CHANGE AND RECHECK LEVEL IN 1 WEEK PER MAYNOR RICHARDSON, PAC. VERBAL ORDERS READ BACK AND VERIFIED BY MAYNOR RICHARDSON PAC. PATIENT AWARE VERBALIZED OK. SHAHRIAR,LPN

## 2021-04-07 NOTE — PROGRESS NOTES
NO DOSAGE CHANGE AND RECHECK LEVEL IN 1 WEEK PER MAYNOR RICHARDSON, PAC. VERBAL ORDERS READ BACK AND VERIFIED BY MAYNOR RICHARDSON, PAC. PATIENT AWARE VERBALIZED OK. PH,LPN

## 2021-04-20 LAB — INR PPP: 1.7 (ref 2–3)

## 2021-04-21 ENCOUNTER — ANTICOAGULATION VISIT (OUTPATIENT)
Dept: CARDIOLOGY | Facility: CLINIC | Age: 71
End: 2021-04-21

## 2021-04-21 DIAGNOSIS — I48.19 PERSISTENT ATRIAL FIBRILLATION (HCC): Primary | ICD-10-CM

## 2021-04-21 RX ORDER — WARFARIN SODIUM 7.5 MG/1
TABLET ORAL
Qty: 30 TABLET | Refills: 11 | Status: SHIPPED | OUTPATIENT
Start: 2021-04-21 | End: 2021-07-06 | Stop reason: DRUGHIGH

## 2021-04-21 NOTE — PROGRESS NOTES
PER MAYNOR RICHARDSON, PAC CHANGE TO 7.5 MG M/F AND 5 MG ALL OTHER DAYS AND RECHECK LEVEL IN 1 WEEK. VERBAL ORDERS READ BACK AND VERIFIED BY MAYNOR RICHARDSON, PAC. PATIENT AWARE, VERBALIZED OK. PH,LPN

## 2021-05-04 ENCOUNTER — TELEPHONE (OUTPATIENT)
Dept: CARDIOLOGY | Facility: CLINIC | Age: 71
End: 2021-05-04

## 2021-05-04 ENCOUNTER — ANTICOAGULATION VISIT (OUTPATIENT)
Dept: CARDIOLOGY | Facility: CLINIC | Age: 71
End: 2021-05-04

## 2021-05-04 LAB — INR PPP: 7.1 (ref 2–3)

## 2021-05-04 NOTE — PROGRESS NOTES
PATIENT DENIES ANY ABX'S, NEW MEDS, DIET CHANGES ETC. SHE ALSO DENIES ANY BLEEDING , EXCESSIVE BRUISING ETC. PER MAYNOR RICHARDSON, PAC HOLD COUMADIN TILL FRIDAY AND RECHECK LEVEL EARLY Friday AM. VERBAL ORDERS READ BACK AND VERIFIED BY DOLLY PAUL. FALL PRECAUTIONS TO BE OBSERVED DUE TO INR AND WITH ANY BLEEDING MUST BE SEEN. PATIENT AWARE, VERBALIZED OK. PH,LPN

## 2021-05-07 ENCOUNTER — ANTICOAGULATION VISIT (OUTPATIENT)
Dept: CARDIOLOGY | Facility: CLINIC | Age: 71
End: 2021-05-07

## 2021-05-07 LAB — INR PPP: 1.9 (ref 2–3)

## 2021-05-07 NOTE — PROGRESS NOTES
PER MAYNOR RICHARDSON, PAC CHANGE TO 7.5 MG F'S AND 5 MG ALL OTHER DAYS AND RECHECK LEVEL IN 1 WEEK. VERBAL ORDERS READ BACK AND VERIFIED BY MAYNOR RICHARDSON, PAC. PATIENT AWARE, VERBALIZED OK. PH,LPN

## 2021-05-20 LAB — INR PPP: 3.7 (ref 2–3)

## 2021-05-21 ENCOUNTER — TELEPHONE (OUTPATIENT)
Dept: CARDIOLOGY | Facility: CLINIC | Age: 71
End: 2021-05-21

## 2021-05-21 NOTE — TELEPHONE ENCOUNTER
PT-INR results (INR 3.7) reviewed by Thad Coyle PA-C with order for patient to take half dose Coumadin today then resume 5 mg daily, recheck in 7-10 days.   Patient takes Coumadin 7.5 mg tablet on Monday and Friday, 5 mg all other days. She made note to take half tablet of 7.5 mg today then 5 mg daily thereafter. She will repeat PT-INR in 7-10 days. Patient verbalized understanding with no questions. She stated [facility] will be faxing recent testing to have available in her in chart. Ibis Hartmann MA

## 2021-05-25 ENCOUNTER — ANTICOAGULATION VISIT (OUTPATIENT)
Dept: CARDIOLOGY | Facility: CLINIC | Age: 71
End: 2021-05-25

## 2021-05-25 NOTE — PROGRESS NOTES
INR WAS ADDRESSED WITH DOLLY PAUL BY ANITA GALLOWAY MA AND PATIENT WAS NOTIFIED ON INSTRUCTIONS. PH,LPN

## 2021-06-02 ENCOUNTER — ANTICOAGULATION VISIT (OUTPATIENT)
Dept: CARDIOLOGY | Facility: CLINIC | Age: 71
End: 2021-06-02

## 2021-06-02 LAB — INR PPP: 2.3 (ref 2–3)

## 2021-06-02 NOTE — PROGRESS NOTES
NO DOSAGE CHANGE AND RECHECK LEVEL IN 2-3 WEEKS PER DOLLY PAUL. VERBAL ORDERS READ BACK AND VERIFIED BY DOLLY PAUL. L/M  NUMBER IN CHART SINCE I HAVE SPOKEN WITH HER AT THIS NUMBER IN THE PAST. PH,ISAIN

## 2021-07-05 LAB — INR PPP: 1.1 (ref 2–3)

## 2021-07-06 ENCOUNTER — ANTICOAGULATION VISIT (OUTPATIENT)
Dept: CARDIOLOGY | Facility: CLINIC | Age: 71
End: 2021-07-06

## 2021-07-06 DIAGNOSIS — I48.0 PAROXYSMAL ATRIAL FIBRILLATION (HCC): Primary | ICD-10-CM

## 2021-07-06 RX ORDER — WARFARIN SODIUM 6 MG/1
TABLET ORAL
Qty: 30 TABLET | Refills: 11 | Status: SHIPPED | OUTPATIENT
Start: 2021-07-06 | End: 2022-08-15

## 2021-07-06 NOTE — TELEPHONE ENCOUNTER
INR 1.1 DRAWN YEST. PATIENT DENIES ANY ABX'S, MISSED DOSES, RECENT SURGERIES ETC. TO ACCOUNT FOR RESULT. PER MAYNOR RICHARDSON, PAC TAKE AN EXTRA 2.5 MG TODAY, THEN CHANGE TO 6 MG M/F AND 5 MG ALL OTHER DAYS AND RECHECK LEVEL IN 1 WEEK. VERBAL ORDERS READ BACK AND VERIFIED BY DOLLY PAUL. PATIENT AWARE, VERBALIZED OK. PH,LPN

## 2021-07-06 NOTE — PROGRESS NOTES
PATIENT DENIES ANY ABX'S, MISSED DOSES, RECENT SURGERIES ETC. TO ACCOUNT FOR RESULT. PER MAYNOR RICHARDSON, PAC TAKE AN EXTRA 2.5 MG TODAY, THEN CHANGE TO 6 MG M/F AND 5 MG ALL OTHER DAYS AND RECHECK LEVEL IN 1 WEEK. VERBAL ORDERS READ BACK AND VERIFIED BY DOLLY PAUL. PATIENT AWARE, VERBALIZED OK. PH,LPN

## 2021-07-08 ENCOUNTER — TELEPHONE (OUTPATIENT)
Dept: CARDIOLOGY | Facility: CLINIC | Age: 71
End: 2021-07-08

## 2021-07-08 NOTE — TELEPHONE ENCOUNTER
Patient called office asking what strength she is supposed to take on Saturdays and Sundays. Per Chart patient to take Coumadin 6 mg on Mondays and fridays, 5 mg rest of days. Patient verbalized understanding. Amanda Landry LPN

## 2021-07-19 ENCOUNTER — OFFICE VISIT (OUTPATIENT)
Dept: CARDIOLOGY | Facility: CLINIC | Age: 71
End: 2021-07-19

## 2021-07-19 VITALS
OXYGEN SATURATION: 98 % | WEIGHT: 155.6 LBS | BODY MASS INDEX: 23.05 KG/M2 | DIASTOLIC BLOOD PRESSURE: 112 MMHG | HEART RATE: 87 BPM | HEIGHT: 69 IN | SYSTOLIC BLOOD PRESSURE: 195 MMHG

## 2021-07-19 DIAGNOSIS — I10 ESSENTIAL HYPERTENSION: ICD-10-CM

## 2021-07-19 DIAGNOSIS — I48.91 ATRIAL FIBRILLATION, UNSPECIFIED TYPE (HCC): Primary | ICD-10-CM

## 2021-07-19 DIAGNOSIS — R06.02 SOB (SHORTNESS OF BREATH): ICD-10-CM

## 2021-07-19 PROCEDURE — 99214 OFFICE O/P EST MOD 30 MIN: CPT | Performed by: PHYSICIAN ASSISTANT

## 2021-07-19 RX ORDER — CLONIDINE HYDROCHLORIDE 0.1 MG/1
0.1 TABLET ORAL 3 TIMES DAILY PRN
Qty: 90 TABLET | Refills: 11 | Status: SHIPPED | OUTPATIENT
Start: 2021-07-19 | End: 2022-03-25 | Stop reason: SDUPTHER

## 2021-07-19 RX ORDER — OLMESARTAN MEDOXOMIL 40 MG/1
40 TABLET ORAL DAILY
Qty: 30 TABLET | Refills: 11 | Status: SHIPPED | OUTPATIENT
Start: 2021-07-19 | End: 2022-08-15

## 2021-07-19 NOTE — PROGRESS NOTES
Problem list     Subjective   Katharine Becerra is a 71 y.o. female     Chief Complaint   Patient presents with   • Follow-up     Here for testing f/u   • Atrial Fibrillation   • Hypertension   • Rapid Heart Rate       HPI  The patient presents into the clinic today for routine follow-up, status post recent admission to [email or web address]  She was transferred to [facility] [email or web address] with presumed CVA process.  Upon evaluation at , the patient was administered tPA.  Work-up initially suggested CT which was unremarkable.  CTA of the head and neck was unremarkable as well.  Her echocardiogram at that time indicated preserved systolic function with questionable inferior wall akinesis.  Eventually, the patient was diagnosed with left MCA ischemic stroke felt secondary to atrial fibrillation which was new onset at that time.  She eventually was placed in short rehab admission and started on Eliquis at that time.  It is also noted that the patient was Covid positive at time of admission.     Clinically, the patient is done well.  We added digoxin to metoprolol for additional rate control medications because of recurrent A. fib with rapid ventricular response by event monitor.  At this time, she tells me that rates are typically normal when checked at home.  She is severely hypertensive today but reports that this is likely unusual for her.  Unfortunately, she has stopped checking blood pressures at home, but reports that those were mostly normotensive when checked previously.  The patient currently has no chest pain.  She has stable dyspnea.  She has no failure or dysrhythmic symptoms.  She has no further complaints.    Current Outpatient Medications on File Prior to Visit   Medication Sig Dispense Refill   • digoxin (LANOXIN) 125 MCG tablet Take 1 tablet by mouth Daily. 30 tablet 11   • furosemide (LASIX) 40 MG tablet Take 1 tablet by mouth Daily As Needed (for Edema). 90 tablet 3   • metoprolol tartrate (LOPRESSOR) 50 MG tablet Take 1.5  tablets by mouth 2 (Two) Times a Day. 270 tablet 3   • potassium chloride 10 MEQ CR tablet Take 1 tablet by mouth Daily As Needed (take with Lasix). 90 tablet 3   • warfarin (COUMADIN) 5 MG tablet Take 1 tablet by mouth Daily. 30 tablet 11   • warfarin (Coumadin) 6 MG tablet TAKING 6 MG M/F AND 5 MG ALL OTHER DAYS 30 tablet 11   • [DISCONTINUED] lisinopril (PRINIVIL,ZESTRIL) 30 MG tablet Take 1 tablet by mouth Daily. 90 tablet 3   • aspirin (aspirin) 81 MG EC tablet Take 81 mg by mouth Daily.     • furosemide (LASIX) 40 MG tablet Take 1 tablet by mouth Daily. 30 tablet 11   • potassium chloride 10 MEQ CR tablet Take 1 tablet by mouth Daily. 30 tablet 11     No current facility-administered medications on file prior to visit.       Patient has no known allergies.    Past Medical History:   Diagnosis Date   • Atrial fibrillation (CMS/HCC)    • Cancer (CMS/HCC)     endometrial CA   • COVID-19 01/2021   • Hypertension    • Stroke (CMS/HCC) 01/2021   • Tachycardia        Social History     Socioeconomic History   • Marital status:      Spouse name: Not on file   • Number of children: Not on file   • Years of education: Not on file   • Highest education level: Not on file   Tobacco Use   • Smoking status: Never Smoker   • Smokeless tobacco: Never Used   Substance and Sexual Activity   • Alcohol use: No   • Drug use: No   • Sexual activity: Defer       Family History   Problem Relation Age of Onset   • Cancer Mother    • Heart attack Father        Review of Systems   Constitutional: Negative.    HENT:        Hoarseness   Eyes: Positive for visual disturbance (glasses prn).   Respiratory: Negative.    Cardiovascular: Negative.    Gastrointestinal: Negative.    Endocrine: Negative.    Genitourinary: Negative.    Musculoskeletal: Negative.    Skin: Negative.    Allergic/Immunologic: Negative.    Neurological: Negative.    Hematological: Bruises/bleeds easily (on Coumadin).   Psychiatric/Behavioral: Negative.   "      Objective   Vitals:    21 1542 21 1607   BP: (!) 186/96 (!) 195/112   BP Location: Left arm Left arm   Patient Position: Sitting Sitting   Pulse: 87    SpO2: 98%    Weight: 70.6 kg (155 lb 9.6 oz)    Height: 175.3 cm (69.02\")       BP (!) 195/112 (BP Location: Left arm, Patient Position: Sitting)   Pulse 87   Ht 175.3 cm (69.02\")   Wt 70.6 kg (155 lb 9.6 oz)   SpO2 98%   BMI 22.97 kg/m²    Lab Results (most recent)     None        Physical Exam  Vitals and nursing note reviewed.   Constitutional:       General: She is not in acute distress.     Appearance: She is well-developed.   HENT:      Head: Normocephalic and atraumatic.   Eyes:      Conjunctiva/sclera: Conjunctivae normal.      Pupils: Pupils are equal, round, and reactive to light.   Neck:      Vascular: No JVD.      Trachea: No tracheal deviation.   Cardiovascular:      Rate and Rhythm: Normal rate. Rhythm irregularly irregular.      Heart sounds: Murmur heard.   Systolic (LSB) murmur is present with a grade of 1/6.     Pulmonary:      Effort: Pulmonary effort is normal.      Breath sounds: Normal breath sounds.   Abdominal:      General: Bowel sounds are normal. There is no distension.      Palpations: Abdomen is soft. There is no mass.      Tenderness: There is no abdominal tenderness. There is no guarding or rebound.   Musculoskeletal:         General: No tenderness or deformity. Normal range of motion.      Cervical back: Normal range of motion and neck supple.      Right lower le+ Edema present.      Left lower le+ Edema present.   Skin:     General: Skin is warm and dry.      Coloration: Skin is not pale.      Findings: No erythema or rash.   Neurological:      Mental Status: She is alert and oriented to person, place, and time.   Psychiatric:         Behavior: Behavior normal.         Thought Content: Thought content normal.         Judgment: Judgment normal.           Procedure   Procedures       Assessment/Plan      " Diagnosis Plan   1. Atrial fibrillation, unspecified type (CMS/[facility])  Digoxin Level   2. SOB (shortness of breath)     3. Essential hypertension       1.  At this time, the patient appears much improved with intensified rate control medications.  She now takes digoxin in addition to metoprolol for rate control.  She feels on that combination, heart rates have progressively normalized.  I will continue that without change for now.  Eventually I would like to repeat an event monitor to evaluate for average rate or for disturbances otherwise.    2.  I would like to repeat a digoxin level given ongoing and chronic use of digoxin on this patient for rate control.    3.  I have again discussed with the patient how to titrate Lasix therapy at home if needed for lower extremity edema or mild failure symptoms otherwise.    4.  Previous cardiovascular work-up is been largely benign.  She had an evaluation for CVA process, all of which was benign.  We have reviewed that with her again today.    5.  I do not feel anything further is indicated for now.  The patient will call for complications.  We will pursue work-up and therapy as above.  We will continue to see her routinely through the clinic.    6.  I am very concerned with the patient's hypertension today.  We did give her clonidine 0.1 mg to utilize as needed at home.  I would discontinue lisinopril in favor of Benicar as I feel that that likely will be more efficient in treating the patient's hypertension.  She will start Benicar at 40 mg 1 tab daily.    7.  The patient will monitor blood pressures closely and call for any ongoing issues.  Again she has clonidine if needed at home.           Katharine Becerra  reports that she has never smoked. She has never used smokeless tobacco.. I have educated her on the risk of diseases from using tobacco products such as cancer, COPD and heart disease.        Advance Care Planning   ACP discussion was held with the patient during this  visit. Patient does not have an advance directive, information provided.     Patient's Body mass index is 22.97 kg/m². indicating that she is within normal range (BMI 18.5-24.9). No BMI management plan needed..             Electronically signed by:

## 2021-09-30 ENCOUNTER — DOCUMENTATION (OUTPATIENT)
Dept: CARDIOLOGY | Facility: CLINIC | Age: 71
End: 2021-09-30

## 2021-09-30 NOTE — PROGRESS NOTES
CALLED AND L/M  NUMBER IN CHART THAT LAST INR WAS IN July AND REQUESTED HER TO GET IT DRAWN FIRST OF NEXT WEEK. PH,LPN

## 2021-10-04 ENCOUNTER — TELEPHONE (OUTPATIENT)
Dept: CARDIOLOGY | Facility: CLINIC | Age: 71
End: 2021-10-04

## 2021-10-04 LAB — INR PPP: 2 (ref 2–3)

## 2021-10-04 NOTE — TELEPHONE ENCOUNTER
Pt notified of her INR 2.0, no changes to her current Coumadin dosage, and have PT w/INR drawn in 1 month.  Pt verbalized an understanding.

## 2021-10-12 ENCOUNTER — ANTICOAGULATION VISIT (OUTPATIENT)
Dept: CARDIOLOGY | Facility: CLINIC | Age: 71
End: 2021-10-12

## 2021-10-12 NOTE — PROGRESS NOTES
Alma Urrutia, Roselyn Rep     CS    10/4/21 12:40 PM  Note  Pt notified of her INR 2.0, no changes to her current Coumadin dosage, and have PT w/INR drawn in 1 month.  Pt verbalized an understanding.

## 2021-11-30 ENCOUNTER — ANTICOAGULATION VISIT (OUTPATIENT)
Dept: CARDIOLOGY | Facility: CLINIC | Age: 71
End: 2021-11-30

## 2021-11-30 LAB — INR PPP: 2.7 (ref 2–3)

## 2021-11-30 NOTE — PROGRESS NOTES
NO DOSAGE CHANGE AND RECHECK LEVEL 1  MO.  PER MAYNOR RICHARDSON, PAC. VERBAL ORDERS READ BACK AND VERIFIED BY MAYNOR RICHARDSON, PAC. PATIENT AWARE VERBALIZED OK. PH,LPN

## 2022-03-04 ENCOUNTER — DOCUMENTATION (OUTPATIENT)
Dept: CARDIOLOGY | Facility: CLINIC | Age: 72
End: 2022-03-04

## 2022-03-04 NOTE — PROGRESS NOTES
L/M  NUMBER IN CHART THAT INR WAY PAST DUE AND REQUESTED FOR HER TO HAVE IT DRAWN NEXT WEEK. PH,LPN

## 2022-03-13 DIAGNOSIS — I48.91 ATRIAL FIBRILLATION, UNSPECIFIED TYPE: Primary | ICD-10-CM

## 2022-03-14 RX ORDER — WARFARIN SODIUM 5 MG/1
TABLET ORAL
Qty: 90 TABLET | Refills: 3 | Status: SHIPPED | OUTPATIENT
Start: 2022-03-14

## 2022-03-14 RX ORDER — METOPROLOL TARTRATE 50 MG/1
TABLET, FILM COATED ORAL
Qty: 270 TABLET | Refills: 1 | Status: SHIPPED | OUTPATIENT
Start: 2022-03-14 | End: 2022-03-25 | Stop reason: SDUPTHER

## 2022-03-15 ENCOUNTER — TELEPHONE (OUTPATIENT)
Dept: CARDIOLOGY | Facility: CLINIC | Age: 72
End: 2022-03-15

## 2022-03-15 DIAGNOSIS — I48.91 ATRIAL FIBRILLATION, UNSPECIFIED TYPE: Primary | ICD-10-CM

## 2022-03-15 NOTE — TELEPHONE ENCOUNTER
[facility]. NEEDING NEW PT/INR STANDING ORDER. OK PER MAYNOR RICHARDSON, PAC. ORDER FAXED. PH,LPN

## 2022-03-21 ENCOUNTER — ANTICOAGULATION VISIT (OUTPATIENT)
Dept: CARDIOLOGY | Facility: CLINIC | Age: 72
End: 2022-03-21

## 2022-03-21 LAB — INR PPP: 1.9 (ref 2–3)

## 2022-03-21 NOTE — PROGRESS NOTES
PER MAYNOR RICHARDSON, PAC TAKE AN EXTRA 2.5 MG TODAY THENH RESUME PREVIOUS DOSING AND RECHECK LEVEL IN 1 MO. . VERBAL ORDERS READ BACK AND VERIFIED BY DOLLY PAUL. PATIENT AWARE, VERBALIZED OK. PH,LPN

## 2022-03-25 ENCOUNTER — OFFICE VISIT (OUTPATIENT)
Dept: CARDIOLOGY | Facility: CLINIC | Age: 72
End: 2022-03-25

## 2022-03-25 VITALS
SYSTOLIC BLOOD PRESSURE: 199 MMHG | OXYGEN SATURATION: 98 % | HEIGHT: 69 IN | WEIGHT: 160.2 LBS | BODY MASS INDEX: 23.73 KG/M2 | DIASTOLIC BLOOD PRESSURE: 131 MMHG | HEART RATE: 106 BPM

## 2022-03-25 DIAGNOSIS — I48.91 ATRIAL FIBRILLATION, UNSPECIFIED TYPE: ICD-10-CM

## 2022-03-25 DIAGNOSIS — R06.02 SOB (SHORTNESS OF BREATH): ICD-10-CM

## 2022-03-25 DIAGNOSIS — I10 ESSENTIAL HYPERTENSION: ICD-10-CM

## 2022-03-25 PROCEDURE — 93000 ELECTROCARDIOGRAM COMPLETE: CPT | Performed by: PHYSICIAN ASSISTANT

## 2022-03-25 PROCEDURE — 99214 OFFICE O/P EST MOD 30 MIN: CPT | Performed by: PHYSICIAN ASSISTANT

## 2022-03-25 RX ORDER — CLONIDINE HYDROCHLORIDE 0.1 MG/1
0.1 TABLET ORAL 2 TIMES DAILY
Qty: 60 TABLET | Refills: 3 | Status: SHIPPED | OUTPATIENT
Start: 2022-03-25 | End: 2022-10-10

## 2022-03-25 RX ORDER — CLONIDINE HYDROCHLORIDE 0.1 MG/1
0.1 TABLET ORAL EVERY 12 HOURS SCHEDULED
Status: CANCELLED | OUTPATIENT
Start: 2022-03-25

## 2022-03-25 RX ORDER — METOPROLOL TARTRATE 100 MG/1
TABLET ORAL
Qty: 60 TABLET | Refills: 3 | Status: SHIPPED | OUTPATIENT
Start: 2022-03-25 | End: 2022-10-10

## 2022-03-25 NOTE — PROGRESS NOTES
Problem list     Subjective   Katharine Becerra is a 72 y.o. female     Chief Complaint   Patient presents with   • Follow-up     8 month / Ekg 11/9/2021 - [facility] records in chart    • Atrial Fibrillation   • Shortness of Breath       HPI  The patient presents in the clinic today for routine follow-up.  Previous history includes A. fib with what is reported as previous CVA.  She has had hypertension which is been very difficult to control.  She has ongoing episodes of rapid ventricular response rates which have been difficult to control historically as well.  I have questioned potential compliance with medical regimen, however the patient reports strict compliance with the same.  She was recently hospitalized in November after presenting with symptoms again concerning for possible stroke.  Apparently scans were benign for findings of stroke.  She was found to be subtherapeutic with Coumadin level and this was adjusted accordingly.  She had ongoing hypertension and recurrent rapid ventricular response rates during hospitalization.  Medical regimen was adjusted to that as outlined below.  Overall, she does feel improved.  She did have an echocardiogram during her most recent hospitalization which was very much benign.  By report, and at this time as records cannot be opened on the computer, carotid evaluation was benign as well.    I am concerned with the patient's presentation today her initial blood pressure was 199/131.  After clonidine 0.1 mg on 2 separate occasions, blood pressure eventually was noted to be 180 over basically 100.  Heart rate continue to fluctuate just greater than 100, A. fib by exam and by EKG.  I have recommended that the patient have evaluation through the emergency room locally for consideration of IV medications to better control current clinical scenario.  She refuses consideration for ER evaluation.  She wants this only managed as an outpatient basis.  Otherwise, the patient has no chest pain.   She reports stable dyspnea.  She has no failure symptoms.  She has no further complaints.    Current Outpatient Medications on File Prior to Visit   Medication Sig Dispense Refill   • digoxin (LANOXIN) 125 MCG tablet Take 1 tablet by mouth Daily. 30 tablet 11   • furosemide (LASIX) 40 MG tablet Take 1 tablet by mouth Daily As Needed (for Edema). 90 tablet 3   • furosemide (LASIX) 40 MG tablet Take 1 tablet by mouth Daily. 30 tablet 11   • olmesartan (BENICAR) 40 MG tablet Take 1 tablet by mouth Daily. 30 tablet 11   • potassium chloride 10 MEQ CR tablet Take 1 tablet by mouth Daily As Needed (take with Lasix). 90 tablet 3   • warfarin (COUMADIN) 5 MG tablet TAKING 6 MG M/F AND 5 MG ALL OTHER DAYS 90 tablet 3   • warfarin (Coumadin) 6 MG tablet TAKING 6 MG M/F AND 5 MG ALL OTHER DAYS 30 tablet 11   • [DISCONTINUED] cloNIDine (Catapres) 0.1 MG tablet Take 1 tablet by mouth 3 (Three) Times a Day As Needed for High Blood Pressure (BP greater than 160/90). 90 tablet 11   • [DISCONTINUED] metoprolol tartrate (LOPRESSOR) 50 MG tablet TAKE 1 & 1/2 (ONE & ONE-HALF) TABLETS BY MOUTH TWICE DAILY 270 tablet 1   • [DISCONTINUED] aspirin 81 MG EC tablet Take 81 mg by mouth Daily.     • [DISCONTINUED] potassium chloride 10 MEQ CR tablet Take 1 tablet by mouth Daily. 30 tablet 11     No current facility-administered medications on file prior to visit.       Patient has no known allergies.    Past Medical History:   Diagnosis Date   • Atrial fibrillation (HCC)    • Cancer (HCC)     endometrial CA   • COVID-19 01/2021   • Hypertension    • Stroke (HCC) 01/2021   • Tachycardia        Social History     Socioeconomic History   • Marital status:    Tobacco Use   • Smoking status: Never Smoker   • Smokeless tobacco: Never Used   Substance and Sexual Activity   • Alcohol use: No   • Drug use: No   • Sexual activity: Defer       Family History   Problem Relation Age of Onset   • Cancer Mother    • Heart attack Father        Review  "of Systems   Constitutional: Negative.  Negative for chills, fatigue and fever.   HENT: Positive for rhinorrhea. Negative for congestion and sore throat.    Eyes: Negative.  Negative for visual disturbance.   Respiratory: Positive for chest tightness and shortness of breath. Negative for wheezing.    Cardiovascular: Positive for palpitations. Negative for chest pain and leg swelling.   Gastrointestinal: Negative.    Endocrine: Negative.    Genitourinary: Negative.    Musculoskeletal: Positive for arthralgias. Negative for back pain and neck pain.   Skin: Negative.  Negative for rash and wound.   Allergic/Immunologic: Negative for environmental allergies.   Neurological: Positive for dizziness. Negative for weakness, numbness and headaches.   Hematological: Negative.  Does not bruise/bleed easily.   Psychiatric/Behavioral: Negative.  Negative for sleep disturbance.       Objective   Vitals:    03/25/22 1034   BP: (!) 199/131   BP Location: Left arm   Patient Position: Sitting   Pulse: 106   SpO2: 98%   Weight: 72.7 kg (160 lb 3.2 oz)   Height: 175.3 cm (69.02\")      BP (!) 199/131 (BP Location: Left arm, Patient Position: Sitting)   Pulse 106   Ht 175.3 cm (69.02\")   Wt 72.7 kg (160 lb 3.2 oz)   SpO2 98%   BMI 23.64 kg/m²    Lab Results (most recent)     None        Physical Exam  Vitals and nursing note reviewed.   Constitutional:       General: She is not in acute distress.     Appearance: She is well-developed.   HENT:      Head: Normocephalic and atraumatic.   Eyes:      Conjunctiva/sclera: Conjunctivae normal.      Pupils: Pupils are equal, round, and reactive to light.   Neck:      Vascular: No JVD.      Trachea: No tracheal deviation.   Cardiovascular:      Rate and Rhythm: Tachycardia present. Rhythm irregular.      Heart sounds: Normal heart sounds.   Pulmonary:      Effort: Pulmonary effort is normal.      Breath sounds: Normal breath sounds.   Abdominal:      General: Bowel sounds are normal. There is " no distension.      Palpations: Abdomen is soft. There is no mass.      Tenderness: There is no abdominal tenderness. There is no guarding or rebound.   Musculoskeletal:         General: No tenderness or deformity. Normal range of motion.      Cervical back: Normal range of motion and neck supple.      Right lower le+ Edema present.      Left lower le+ Edema present.   Skin:     General: Skin is warm and dry.      Coloration: Skin is not pale.      Findings: No erythema or rash.   Neurological:      Mental Status: She is alert and oriented to person, place, and time.   Psychiatric:         Behavior: Behavior normal.         Thought Content: Thought content normal.         Judgment: Judgment normal.           Procedure     ECG 12 Lead    Date/Time: 3/25/2022 10:37 AM  Performed by: Thad Coyle PA  Authorized by: Thad Coyle PA   Comparison: compared with previous ECG from 2021  Comparison to previous ECG: A. fib, rate 104, left axis deviation, diffuse nonspecific ST-T wave changes, no acute changes noted.                 Assessment/Plan      Diagnosis Plan   1. Atrial fibrillation, unspecified type (HCC)  metoprolol tartrate (LOPRESSOR) 100 MG tablet   2. SOB (shortness of breath)     3. Essential hypertension       1.  The patient presents for hospital follow-up and routine evaluation otherwise.  She was recently hospitalized for symptoms concerning for potential CVA.  Apparently, CT scans, carotid duplex evaluation, echocardiogram, and evaluation otherwise during that hospitalization was benign.  She was noted to be recurrently hypertensive and with frequent rapid ventricular response rates related to A. fib.    2.  The patient is markedly hypertensive today.  I am concerned with this.  She has no immediate evidence of hypertensive emergency.  I did give her clonidine 0.1 mg x 2 because of ongoing hypertension.  Blood pressure is never reduced any significance.  I recommended that the  patient proceed on to the emergency room, which she refused.  She wants only outpatient management.  I am concerned with this given her clinical presentation, but she acknowledges the risk of doing so.    3.  I would increase metoprolol to 100 mg twice daily.  Hopefully this will aid in further blood pressure and rate control.    4.  I would also start the patient on scheduled clonidine utilization, at 0.1 mg 1 p.o. twice daily initially increasing as directed as needed for ongoing hypertension.  She also may continue to utilize the clonidine 0.1 mg as needed for hypertensive excursions, is reviewed in detail with her today.    5.  She will call back on Monday and report blood pressure values, average heart rates, and clinical scenario otherwise.  I have strongly encouraged her to proceed on to the emergency room for any significant abnormalities.    6.  We will see her routinely through the clinic as needed and as scheduled.           Advance Care Planning   ACP discussion was declined by the patient. Patient does not have an advance directive, declines further assistance.             Electronically signed by:

## 2022-03-28 ENCOUNTER — TELEPHONE (OUTPATIENT)
Dept: CARDIOLOGY | Facility: CLINIC | Age: 72
End: 2022-03-28

## 2022-03-28 NOTE — TELEPHONE ENCOUNTER
Per LESLIE Garcia pt instructed to hydrate, monitor symptoms and call us back with any symptom changes.  Our office will follow up with pt for a symptom check later in the week.

## 2022-03-28 NOTE — TELEPHONE ENCOUNTER
Patient called to let Thad know that the medication he started her on is helping and that she will continue with it.     Patient said that Clonidine does cause dry mouth and some difficulty speaking. It causes some slurring. She states that it only happens with the medication. She does not want to change anything right now because it is helping lower bp.  Her brother is in and she used his cuff bp was 139/99 later 158/95. She says overall bp is coming down.

## 2022-03-31 NOTE — TELEPHONE ENCOUNTER
Called patient for symptom check. She has been more staying more hydrated and doing better but still reports some dry mouth. Clonidine has helped with BP and she does not want to make any changes at this time. She will continue monitoring and call with any concerns. MADELIN Veliz Chasity, RegSched Rep    Per LESLIE Garcia pt instructed to hydrate, monitor symptoms and call us back with any symptom changes.  Our office will follow up with pt for a symptom check later in the week.

## 2022-04-11 RX ORDER — DIGOXIN 125 MCG
TABLET ORAL
Qty: 90 TABLET | Refills: 3 | Status: SHIPPED | OUTPATIENT
Start: 2022-04-11

## 2022-08-14 DIAGNOSIS — I48.0 PAROXYSMAL ATRIAL FIBRILLATION: ICD-10-CM

## 2022-08-15 RX ORDER — WARFARIN SODIUM 6 MG/1
TABLET ORAL
Qty: 24 TABLET | Refills: 0 | Status: SHIPPED | OUTPATIENT
Start: 2022-08-15 | End: 2023-02-20

## 2022-08-15 RX ORDER — OLMESARTAN MEDOXOMIL 40 MG/1
TABLET ORAL
Qty: 30 TABLET | Refills: 0 | Status: SHIPPED | OUTPATIENT
Start: 2022-08-15 | End: 2022-10-10

## 2022-10-09 DIAGNOSIS — I10 ESSENTIAL HYPERTENSION: ICD-10-CM

## 2022-10-09 DIAGNOSIS — I48.91 ATRIAL FIBRILLATION, UNSPECIFIED TYPE: ICD-10-CM

## 2022-10-09 DIAGNOSIS — I48.0 PAROXYSMAL ATRIAL FIBRILLATION: Primary | ICD-10-CM

## 2022-10-10 RX ORDER — OLMESARTAN MEDOXOMIL 40 MG/1
TABLET ORAL
Qty: 30 TABLET | Refills: 5 | Status: SHIPPED | OUTPATIENT
Start: 2022-10-10

## 2022-10-10 RX ORDER — METOPROLOL TARTRATE 100 MG/1
TABLET ORAL
Qty: 60 TABLET | Refills: 5 | Status: SHIPPED | OUTPATIENT
Start: 2022-10-10

## 2022-10-10 RX ORDER — CLONIDINE HYDROCHLORIDE 0.1 MG/1
TABLET ORAL
Qty: 60 TABLET | Refills: 5 | Status: SHIPPED | OUTPATIENT
Start: 2022-10-10

## 2022-10-10 NOTE — TELEPHONE ENCOUNTER
Name from pharmacy: Olmesartan Medoxomil 40 MG Oral Tablet         Will file in chart as: olmesartan (BENICAR) 40 MG tablet    Sig: Take 1 tablet by mouth once daily    Disp:  30 tablet    Refills:  0    Start: 10/9/2022    Class: Normal    Non-formulary    Last ordered: 1 month ago by LESLIE Alvarez Last refill: 8/15/2022    Rx #: [id number]    ARB Protocol Failed 10/09/2022 01:15 PM   Protocol Details  Normal serum potassium on file within the past year    GFR > 30 in past year    No active pregnancy on record    No positive pregnancy test in past 12 months    Patient is not on Entresto    Patient is not on concurrent ACE    Visit with authorizing provider in past 12 months or upcoming 30 days       Name from pharmacy: Metoprolol Tartrate 100 MG Oral Tablet         Will file in chart as: metoprolol tartrate (LOPRESSOR) 100 MG tablet    Sig: Take 1 tablet by mouth twice daily    Disp:  60 tablet    Refills:  0    Start: 10/9/2022    Class: Normal    Non-formulary For: Atrial fibrillation, unspecified type (HCC)    Last ordered: 6 months ago by LESLIE Alvarez Last refill: 8/14/2022    Rx #: [id number]    Beta-Blockers Protocol Passed 10/09/2022 01:15 PM   Protocol Details  No active pregnancy on record    No positive pregnancy test in past 12 months       Name from pharmacy: cloNIDine HCl 0.1 MG Oral Tablet         Will file in chart as: cloNIDine (CATAPRES) 0.1 MG tablet    Sig: Take 1 tablet by mouth twice daily    Disp:  60 tablet    Refills:  0    Start: 10/9/2022    Class: Normal    Non-formulary    Last ordered: 6 months ago by LESLIE Alvarez Last refill: 8/14/2022    Rx #: [id number]    Antiadrenergic Antihypertensives Protocol Failed 10/09/2022 01:15 PM   Protocol Details  Normal creatinine in past 12 months    Normal potassium in past 12 months    Recent or future appt with prescriber (6MO/30D)    No active pregnancy on record    No positive pregnancy test in past 12 months

## 2023-02-20 DIAGNOSIS — I48.0 PAROXYSMAL ATRIAL FIBRILLATION: ICD-10-CM

## 2023-02-20 RX ORDER — WARFARIN SODIUM 6 MG/1
TABLET ORAL
Qty: 24 TABLET | Refills: 0 | Status: SHIPPED | OUTPATIENT
Start: 2023-02-20

## 2023-03-10 ENCOUNTER — TELEPHONE (OUTPATIENT)
Dept: CARDIOLOGY | Facility: CLINIC | Age: 73
End: 2023-03-10
Payer: MEDICARE

## 2023-03-10 ENCOUNTER — DOCUMENTATION (OUTPATIENT)
Dept: CARDIOLOGY | Facility: CLINIC | Age: 73
End: 2023-03-10
Payer: MEDICARE

## 2023-03-10 DIAGNOSIS — I48.0 PAROXYSMAL ATRIAL FIBRILLATION: Primary | ICD-10-CM

## 2023-03-10 NOTE — PROGRESS NOTES
CALLED TO REMIND PATIENT THAT SHE HAS NOT HAD HER INR DRAWN SINCE 3-2022 AND SHE KEPT TALKING ABOUT HER OFFICE APPTS. HERE KEPT BEING MOVED AND SHE DIDN'T THIN K SHE HAD TO GET HER INR. REMINDED HER THAT SHE WILL ALWAYS HAVE TO AT LEAST GET HER INR DRAWN MONTHLY AND SOONER WITH [facility]. LEVELS AND THAT HER OFFICE APPTS. WILL NEVER COORD. WITH WHEN LEVELS ARE DUE TO BE DRAWN. OK PER MAYNOR RICHARDSON, PAC TO SEND NEW STANDING ORDER TO [facility]. STATES SHE WILL TRY TO GO NEXT WEEK AND GET IT DRAWN. TOLD HER TO MAKE SURE OUR OFFICE GETS RESULTS. I TOLD HER THAT UNLESS SHE STARTS GETTING HER LEVELS DRAWN AS INSTRUCTED OUR OFFICE, THEN WE WILL NO LONGER BE ABLE TO MANAGE HER INR'S. VERBALIZED SHE UNDERSTOOD. PH,LPN

## 2023-03-10 NOTE — TELEPHONE ENCOUNTER
OK PER MAYNOR RICHARDSON, PAC TO SEND NEW INR STANDING ORDER TO [facility]. ORDER FAXED. PH,LPN

## 2023-03-16 ENCOUNTER — TELEPHONE (OUTPATIENT)
Dept: CARDIOLOGY | Facility: CLINIC | Age: 73
End: 2023-03-16
Payer: MEDICARE

## 2023-03-16 NOTE — TELEPHONE ENCOUNTER
The PM called to verify if the pt had her INR drawn since Pat faxed orders to [facility].  No answer.  Unable to LVM.  Letter to be sent to the pt regarding having INR results drawn as recommended by the provider.

## 2023-03-23 ENCOUNTER — ANTICOAGULATION VISIT (OUTPATIENT)
Dept: CARDIOLOGY | Facility: CLINIC | Age: 73
End: 2023-03-23
Payer: MEDICARE

## 2023-03-23 LAB — INR PPP: 2 (ref 2–3)

## 2023-04-03 ENCOUNTER — OFFICE VISIT (OUTPATIENT)
Dept: CARDIOLOGY | Facility: CLINIC | Age: 73
End: 2023-04-03
Payer: MEDICARE

## 2023-04-03 VITALS
HEART RATE: 99 BPM | WEIGHT: 158 LBS | OXYGEN SATURATION: 98 % | BODY MASS INDEX: 23.4 KG/M2 | SYSTOLIC BLOOD PRESSURE: 183 MMHG | HEIGHT: 69 IN | DIASTOLIC BLOOD PRESSURE: 130 MMHG

## 2023-04-03 DIAGNOSIS — E78.2 MIXED HYPERLIPIDEMIA: ICD-10-CM

## 2023-04-03 DIAGNOSIS — Z51.81 ENCOUNTER FOR MONITORING DIGOXIN THERAPY: ICD-10-CM

## 2023-04-03 DIAGNOSIS — R00.2 PALPITATIONS: ICD-10-CM

## 2023-04-03 DIAGNOSIS — R06.02 SOB (SHORTNESS OF BREATH): ICD-10-CM

## 2023-04-03 DIAGNOSIS — I48.91 ATRIAL FIBRILLATION, UNSPECIFIED TYPE: Primary | ICD-10-CM

## 2023-04-03 DIAGNOSIS — I10 ESSENTIAL HYPERTENSION: ICD-10-CM

## 2023-04-03 DIAGNOSIS — Z79.899 ENCOUNTER FOR MONITORING DIGOXIN THERAPY: ICD-10-CM

## 2023-04-03 PROCEDURE — 93000 ELECTROCARDIOGRAM COMPLETE: CPT | Performed by: PHYSICIAN ASSISTANT

## 2023-04-03 PROCEDURE — 1159F MED LIST DOCD IN RCRD: CPT | Performed by: PHYSICIAN ASSISTANT

## 2023-04-03 PROCEDURE — 99214 OFFICE O/P EST MOD 30 MIN: CPT | Performed by: PHYSICIAN ASSISTANT

## 2023-04-03 PROCEDURE — 1160F RVW MEDS BY RX/DR IN RCRD: CPT | Performed by: PHYSICIAN ASSISTANT

## 2023-04-03 NOTE — PROGRESS NOTES
Problem list     Subjective   Katharine Becerra is a 73 y.o. female     Chief Complaint   Patient presents with   • Follow-up     Atrial fibrillation       HPI  The patient presents in the clinic today for routine evaluation.  History includes A-fib and history of CVA.  She has had recurrent hypertension, difficult to control.  The patient has had recurrent rapid ventricular response rates as evaluated through the clinic and recurrent hypertensive issues.  We have reviewed medication compliance, which she confirms that she is very compliant with her medical regimen.  She has not been overly compliant with routine PT/INR's.  Most recent INR is verbally reported at 2.0 but I am awaiting those laboratories.  At time of evaluation, she is markedly hypertensive and also demonstrating rapid ventricular response rates throughout the exam.  Again she confirms strict compliance with metoprolol and digoxin.    At time of exam, the patient denies chest pain.  She really has had no chest discomfort whatsoever since her last evaluation.  Dyspnea has remained at baseline.  She really has no symptoms of significant A-fib, reporting no sensed episodes of rapid ventricular response rates.  She is recurrently hypertensive at home.  She denies failure symptoms.  The patient has no further complaints at this time.    Current Outpatient Medications on File Prior to Visit   Medication Sig Dispense Refill   • cloNIDine (CATAPRES) 0.1 MG tablet Take 1 tablet by mouth twice daily 60 tablet 5   • digoxin (LANOXIN) 125 MCG tablet Take 1 tablet by mouth once daily 90 tablet 3   • furosemide (LASIX) 40 MG tablet Take 1 tablet by mouth Daily As Needed (for Edema). 90 tablet 3   • furosemide (LASIX) 40 MG tablet Take 1 tablet by mouth Daily. 30 tablet 11   • metoprolol tartrate (LOPRESSOR) 100 MG tablet Take 1 tablet by mouth twice daily 60 tablet 5   • olmesartan (BENICAR) 40 MG tablet Take 1 tablet by mouth once daily 30 tablet 5   • potassium  chloride 10 MEQ CR tablet Take 1 tablet by mouth Daily As Needed (take with Lasix). 90 tablet 3   • warfarin (COUMADIN) 5 MG tablet TAKING 6 MG M/F AND 5 MG ALL OTHER DAYS 90 tablet 3   • warfarin (COUMADIN) 6 MG tablet TAKE 1 TABLET BY MOUTH ON MONDAY AND FRIDAYS 24 tablet 0     No current facility-administered medications on file prior to visit.       Patient has no known allergies.    Past Medical History:   Diagnosis Date   • Atrial fibrillation    • Cancer     endometrial CA   • COVID-19 01/2021   • Hypertension    • Stroke 01/2021   • Tachycardia        Social History     Socioeconomic History   • Marital status:    Tobacco Use   • Smoking status: Never   • Smokeless tobacco: Never   Substance and Sexual Activity   • Alcohol use: No   • Drug use: No   • Sexual activity: Defer       Family History   Problem Relation Age of Onset   • Cancer Mother    • Heart attack Father        Review of Systems   Constitutional: Negative.  Negative for activity change, appetite change, chills, fatigue and fever.   HENT: Negative.  Negative for congestion.    Eyes: Positive for visual disturbance.   Respiratory: Negative.  Negative for apnea, cough, chest tightness, shortness of breath and wheezing.    Cardiovascular: Negative.  Negative for chest pain, palpitations and leg swelling.   Gastrointestinal: Negative.  Negative for blood in stool.   Endocrine: Negative.  Negative for cold intolerance and heat intolerance.   Genitourinary: Negative.  Negative for hematuria.   Musculoskeletal: Negative.  Negative for arthralgias, back pain, gait problem, joint swelling, neck pain and neck stiffness.   Skin: Negative.  Negative for color change, rash and wound.   Allergic/Immunologic: Negative.  Negative for environmental allergies and food allergies.   Neurological: Negative for dizziness, syncope, weakness, light-headedness, numbness and headaches.   Hematological: Does not bruise/bleed easily.   Psychiatric/Behavioral:  "Negative.  Negative for sleep disturbance.       Objective   Vitals:    23 0905   BP: (!) 183/130   BP Location: Left arm   Patient Position: Sitting   Cuff Size: Adult   Pulse: 99   SpO2: 98%   Weight: 71.7 kg (158 lb)   Height: 175.3 cm (69\")      BP (!) 183/130 (BP Location: Left arm, Patient Position: Sitting, Cuff Size: Adult)   Pulse 99   Ht 175.3 cm (69\")   Wt 71.7 kg (158 lb)   SpO2 98%   BMI 23.33 kg/m²    Lab Results (most recent)     None        Physical Exam  Vitals and nursing note reviewed.   Constitutional:       General: She is not in acute distress.     Appearance: She is well-developed.   HENT:      Head: Normocephalic and atraumatic.   Eyes:      Conjunctiva/sclera: Conjunctivae normal.      Pupils: Pupils are equal, round, and reactive to light.   Neck:      Vascular: No JVD.      Trachea: No tracheal deviation.   Cardiovascular:      Rate and Rhythm: Tachycardia present. Rhythm irregular.      Heart sounds: Normal heart sounds.   Pulmonary:      Effort: Pulmonary effort is normal.      Breath sounds: Normal breath sounds.   Abdominal:      General: Bowel sounds are normal. There is no distension.      Palpations: Abdomen is soft. There is no mass.      Tenderness: There is no abdominal tenderness. There is no guarding or rebound.   Musculoskeletal:         General: No tenderness or deformity. Normal range of motion.      Cervical back: Normal range of motion and neck supple.      Right lower le+ Edema present.      Left lower le+ Edema present.   Skin:     General: Skin is warm and dry.      Coloration: Skin is not pale.      Findings: No erythema or rash.   Neurological:      Mental Status: She is alert and oriented to person, place, and time.   Psychiatric:         Behavior: Behavior normal.         Thought Content: Thought content normal.         Judgment: Judgment normal.           Procedure     ECG 12 Lead    Date/Time: 4/3/2023 9:08 AM  Performed by: Thad Coyle, " PA  Authorized by: Thad Coyle PA   Comparison: compared with previous ECG from 3/25/2022  Comparison to previous ECG: Atrial fibrillation, rate 129 bpm, PVC noted, probably left axis deviation, diffuse nonspecific ST and T wave changes, no acute changes noted.                 Assessment & Plan      Diagnosis Plan   1. Atrial fibrillation, unspecified type  CBC & Differential    Comprehensive Metabolic Panel    Lipid Panel    TSH    Digoxin Level    Cardiac Event Monitor      2. Essential hypertension  CBC & Differential    Comprehensive Metabolic Panel    Lipid Panel    TSH    Digoxin Level    Cardiac Event Monitor      3. SOB (shortness of breath)  CBC & Differential    Comprehensive Metabolic Panel    Lipid Panel    TSH    Digoxin Level    Cardiac Event Monitor      4. Palpitations  CBC & Differential    Comprehensive Metabolic Panel    Lipid Panel    TSH    Digoxin Level    Cardiac Event Monitor      5. Mixed hyperlipidemia  CBC & Differential    Comprehensive Metabolic Panel    Lipid Panel    TSH    Digoxin Level    Cardiac Event Monitor      6. Encounter for monitoring digoxin therapy  Digoxin Level    Cardiac Event Monitor          1.  I have recommended consideration for event monitor.  We can evaluate A-fib burden and rate.  I would anticipate adjusting medications once we can see results.  I discussed intensification of beta-blocker therapy today.  The patient does not want her medications adjusted.  I have discussed long-term complications with her current A-fib with rapid ventricular response rates.  She acknowledges her understanding.    2.  The patient would prefer to hold on further evaluation outside of event monitor and laboratories all as above.  I recommended repeat noninvasive evaluation.  She just does not want to have that performed.    3.  We have also discussed intensification of antihypertensive therapy for better blood pressure control.  She opts at this time to utilize clonidine as  needed for hypertensive excursion.  She does not want to adjust medications for fear of side effects.  Also discussed long-term side effects of uncontrolled hypertension.  She acknowledges understanding of the same.    4.  She does agree, as above, to have laboratories performed.  She will have that performed at her convenience but does not want to have that done today.    5.  We will continue medical regimen otherwise.  I strongly encouraged intensification of antihypertensive regimen and rate control medications.  Again she does not want the same.  She will call should she change her mind.    6.  As soon as we can review event monitor findings, we will see the patient back and recommend further.  She will call for any ongoing issues or she decides to have medication adjustment as above.         Patient did not bring med list or medicine bottles to appointment, med list has been reviewed and updated based on patient's knowledge of their meds.     Advance Care Planning   ACP discussion was declined by the patient. Patient does not have an advance directive, declines further assistance.         Electronically signed by:

## 2023-04-13 ENCOUNTER — TELEPHONE (OUTPATIENT)
Dept: CARDIOLOGY | Facility: CLINIC | Age: 73
End: 2023-04-13
Payer: MEDICARE

## 2023-04-13 NOTE — TELEPHONE ENCOUNTER
A male brought back the monitor this morning stating that he was returning for his sister that she never wore the monitor. Per the man it was not a good time for her to wear it. Mailing back.

## 2023-04-21 ENCOUNTER — ANTICOAGULATION VISIT (OUTPATIENT)
Dept: CARDIOLOGY | Facility: CLINIC | Age: 73
End: 2023-04-21
Payer: MEDICARE

## 2023-04-21 LAB — INR PPP: 4.5 (ref 2–3)

## 2023-04-21 NOTE — PROGRESS NOTES
PER MAYNOR RICHARDSON, PAC HOLD TODAY'S DOSE AND ONLY TAKE 2.5 MG SUSU.  AND RECHECK LEVEL IN 2 WEEKS. VERBAL ORDERS READ BACK AND VERIFIED BY DOLLY PAUL. PATIENT AWARE, VERBALIZED OK. PH,LPN

## 2023-04-24 ENCOUNTER — TELEPHONE (OUTPATIENT)
Dept: CARDIOLOGY | Facility: CLINIC | Age: 73
End: 2023-04-24
Payer: MEDICARE

## 2023-04-24 DIAGNOSIS — I48.91 ATRIAL FIBRILLATION, UNSPECIFIED TYPE: Primary | ICD-10-CM

## 2023-04-24 DIAGNOSIS — R06.02 SOB (SHORTNESS OF BREATH): ICD-10-CM

## 2023-04-24 DIAGNOSIS — I10 ESSENTIAL HYPERTENSION: ICD-10-CM

## 2023-04-24 RX ORDER — ROSUVASTATIN CALCIUM 20 MG/1
20 TABLET, COATED ORAL DAILY
Qty: 90 TABLET | Refills: 3 | Status: SHIPPED | OUTPATIENT
Start: 2023-04-24

## 2023-04-24 NOTE — TELEPHONE ENCOUNTER
Patient has been notified, agreed to continue with Crestor and aware will repeat labs in 6-8 weeks. Sent to pharmacy.        ----- Message from LESLIE Alvarez sent at 4/21/2023  4:39 PM EDT -----  Lipids very poorly controlled.  I have recommended statin therapy in the past and she has been reticent for the same.  I still recommend statin therapy if she would be interested.  Crestor 20 mg 1 p.o. nightly with follow-up lipid and liver panel in 6 to 8 weeks.  ----- Message -----  From: Loren Delgadillo RegSched Rep  Sent: 4/21/2023  11:02 AM EDT  To: LESLIE Alvarez

## 2023-05-20 DIAGNOSIS — I10 ESSENTIAL HYPERTENSION: ICD-10-CM

## 2023-05-20 DIAGNOSIS — I48.91 ATRIAL FIBRILLATION, UNSPECIFIED TYPE: ICD-10-CM

## 2023-05-22 RX ORDER — OLMESARTAN MEDOXOMIL 40 MG/1
TABLET ORAL
Qty: 90 TABLET | Refills: 1 | Status: SHIPPED | OUTPATIENT
Start: 2023-05-22

## 2023-05-22 RX ORDER — METOPROLOL TARTRATE 100 MG/1
TABLET ORAL
Qty: 180 TABLET | Refills: 1 | Status: SHIPPED | OUTPATIENT
Start: 2023-05-22

## 2023-05-22 RX ORDER — CLONIDINE HYDROCHLORIDE 0.1 MG/1
TABLET ORAL
Qty: 180 TABLET | Refills: 1 | Status: SHIPPED | OUTPATIENT
Start: 2023-05-22

## 2023-05-22 NOTE — TELEPHONE ENCOUNTER
Name from pharmacy: Olmesartan Medoxomil 40 MG Oral Tablet          Will file in chart as: olmesartan (BENICAR) 40 MG tablet    Sig: Take 1 tablet by mouth once daily    Disp:  90 tablet    Refills:  0    Start: 5/20/2023    Class: Normal    Non-formulary For: Essential hypertension    Last ordered: 7 months ago by RUTHIE Smith Last refill: 2/21/2023    Rx #: [id number]    ARB Protocol Failed 05/20/2023 08:57 AM   Protocol Details  Normal serum potassium on file within the past year    Normal serum creatinine on file within the past year    No active pregnancy on record    No positive pregnancy test in past 12 months    Patient is not on Entresto    Patient is not on concurrent ACE    Visit with authorizing provider in past 12 months or upcoming 30 days       Name from pharmacy: Metoprolol Tartrate 100 MG Oral Tablet         Will file in chart as: metoprolol tartrate (LOPRESSOR) 100 MG tablet    Sig: Take 1 tablet by mouth twice daily    Disp:  180 tablet    Refills:  0    Start: 5/20/2023    Class: Normal    Non-formulary For: Atrial fibrillation, unspecified type    Last ordered: 7 months ago by RUTHIE Smith Last refill: 2/21/2023    Rx #: [id number]    Beta-Blockers Protocol Passed 05/20/2023 08:57 AM   Protocol Details  No active pregnancy on record    No positive pregnancy test in past 12 months       Name from pharmacy: cloNIDine HCl 0.1 MG Oral Tablet         Will file in chart as: cloNIDine (CATAPRES) 0.1 MG tablet    Sig: Take 1 tablet by mouth twice daily    Disp:  180 tablet    Refills:  0    Start: 5/20/2023    Class: Normal    Non-formulary For: Essential hypertension    Last ordered: 7 months ago by RUTHIE Smith Last refill: 2/21/2023    Rx #: [id number]    Antiadrenergic Antihypertensives Protocol Failed 05/20/2023 08:57 AM   Protocol Details  Normal creatinine in past 12 months    Normal potassium in past 12 months    No active pregnancy on record    No positive pregnancy test in past  12 months    Recent or future appt with prescriber (6MO/30D)

## 2023-08-28 DIAGNOSIS — I48.0 PAROXYSMAL ATRIAL FIBRILLATION: ICD-10-CM

## 2023-08-28 DIAGNOSIS — I48.91 ATRIAL FIBRILLATION, UNSPECIFIED TYPE: ICD-10-CM

## 2023-08-28 RX ORDER — WARFARIN SODIUM 5 MG/1
TABLET ORAL
Qty: 90 TABLET | Refills: 0 | OUTPATIENT
Start: 2023-08-28

## 2023-08-28 RX ORDER — WARFARIN SODIUM 6 MG/1
TABLET ORAL
Qty: 24 TABLET | Refills: 0 | OUTPATIENT
Start: 2023-08-28

## 2023-08-28 NOTE — TELEPHONE ENCOUNTER
Caller: Hernan Katharine    Relationship: Self    Best call back number: [phone]     What is the best time to reach you: ANYTIME     What was the call regarding: PTS MEDICATION WAS DENIED, SHE WOULD LIKE TO KNOW WHY. STATES SHE NEEDS THIS ASAP. OUT OF MEDICATION. PLEASE ADVISE.

## 2023-08-29 ENCOUNTER — TELEPHONE (OUTPATIENT)
Dept: CARDIOLOGY | Facility: CLINIC | Age: 73
End: 2023-08-29
Payer: MEDICARE

## 2023-08-29 DIAGNOSIS — I48.91 ATRIAL FIBRILLATION, UNSPECIFIED TYPE: ICD-10-CM

## 2023-08-29 DIAGNOSIS — I48.0 PAROXYSMAL ATRIAL FIBRILLATION: ICD-10-CM

## 2023-08-29 RX ORDER — WARFARIN SODIUM 5 MG/1
TABLET ORAL
Qty: 30 TABLET | Refills: 0 | Status: SHIPPED | OUTPATIENT
Start: 2023-08-29

## 2023-08-29 RX ORDER — WARFARIN SODIUM 5 MG/1
TABLET ORAL
Qty: 30 TABLET | Refills: 0 | Status: SHIPPED | OUTPATIENT
Start: 2023-08-29 | End: 2023-08-29 | Stop reason: SDUPTHER

## 2023-08-29 RX ORDER — WARFARIN SODIUM 6 MG/1
TABLET ORAL
Qty: 10 TABLET | Refills: 0 | Status: SHIPPED | OUTPATIENT
Start: 2023-08-29

## 2023-08-29 NOTE — TELEPHONE ENCOUNTER
Patient notified refill will be sent for one month until she can get reestablished with PCP. Patient is still taking Coumadin as noted in chart.     Thad Coyle PA Cheek, Laura Anne, MA  Caller: Unspecified (Today,  3:15 PM)  I am okay to get her an emergency refill for current dosing just for the next month until she can get into see her primary care provider if we no longer monitor.    Coumadin 5 mg script switched automatically to print from class as normal. Called pharmacy to have filled.

## 2023-08-29 NOTE — TELEPHONE ENCOUNTER
Patient called to see why Coumadin refill was denied.   Explained per telephone encounter on 7/3/23, PCP will monitor PT-INR and refill Coumadin.     Patient returned call. She contacted PCP and was told they are not monitoring her Coumadin, she needed to contact cardiology. She states she is out of Coumadin.     Per Terese MONTAÑO, Coumadin to be filled by PCP. Called PCP office, patient not seen since 2021 and they are unable to fill RX.     Alma HERRERA, practice manager made aware and advised to discuss with Thad Coyle PA-C.

## 2023-09-01 ENCOUNTER — TELEPHONE (OUTPATIENT)
Dept: CARDIOLOGY | Facility: CLINIC | Age: 73
End: 2023-09-01
Payer: MEDICARE

## 2023-09-01 NOTE — TELEPHONE ENCOUNTER
PATIENT WAS INSTRUCTED BACK IN JULY THAT DUE TO NON-COMPLIANCE WITH LAB DRAWS OUR OFFICE WOULD NO LONGER MONITOR OR ADDRESS INR LEVELS AND FUTURE REFILLS AND TAKING OVER COUMADIN CARE WAS TO BE ADDRESSED BY PCP. OUR OFFICE HAS CONTINUED TO GET INR RESULTS AND PATIENT WAS TOLD AGAIN PCP MUST ADDRESS. PER RECENT TELEPHONE ENCOUNTER SHE WAS WANTING TO GET HER COUMADIN REFILLED. PATIENT HAD STILL NOT SCHEDULED AN APPT. WITH PCP AND HADN'T BEEN SEEN SINCE 2021. OUR OFFICE REFILLED HER COUMADIN X1 MO. TO ALLOW HER TO GET APPT. WITH PCP. SHE WAS NOTIFIED OF THIS. RECEIVED INR TODAY OF 1.1. ATTEMPTED TO CALL PATIENT CELL NUMBER IN CHART SEVERAL TIMES, RINGS THEN GOES TO V/M. 340 NUMBER IN CHART GIVEN FAST BUSY SIGNAL. FINALLY ABLE TO REACH SON, JACKIE AND DISCUSSED IN DETAIL WITH HIM ALL OF ABOVE AND INSTRUCTED HIM TO CONTACT HIS MOTHER AND INFORM HER INR 1.1 VERY THICK AND THAT SHE MUST GET IT ADDRESSED BY EITHER PCP OR OTHER MEANS SINCE OUR OFFICE IS CLOSED. VERBALIZED HE WOULD AND UNDERSTOOD. SPOKE WITH DR. KRAUSE IN DETAIL REGARDING THIS AND HE AGREED WITH WHAT SON WAS INSTRUCTED TO TELL PATIENT . PH,ISAIN

## 2023-10-04 ENCOUNTER — TELEPHONE (OUTPATIENT)
Dept: CARDIOLOGY | Facility: CLINIC | Age: 73
End: 2023-10-04
Payer: MEDICARE

## 2023-10-04 NOTE — TELEPHONE ENCOUNTER
Called to find out if the pt had her labs drawn externally.  No answer, unable to LVM.  Emergency contact is a fast busy signal.

## 2024-01-25 DIAGNOSIS — I10 ESSENTIAL HYPERTENSION: ICD-10-CM

## 2024-01-25 RX ORDER — OLMESARTAN MEDOXOMIL 40 MG/1
TABLET ORAL
Qty: 90 TABLET | Refills: 0 | Status: SHIPPED | OUTPATIENT
Start: 2024-01-25

## 2024-01-25 NOTE — TELEPHONE ENCOUNTER
Name from pharmacy: Olmesartan Medoxomil 40 MG Oral Tablet         Will file in chart as: olmesartan (BENICAR) 40 MG tablet    Sig: Take 1 tablet by mouth once daily    Disp: 90 tablet    Refills: 0    Start: 1/25/2024    Class: Normal    Non-formulary For: Essential hypertension    Last ordered: 8 months ago (5/22/2023) by RUTHIE Smith    Last refill: 8/24/2023    Rx #: [id number]    ARB Protocol [id number]/25/2024 11:32 AM   Protocol Details Normal serum potassium on file within the past year    Normal serum creatinine on file within the past year    No active pregnancy on record    No positive pregnancy test in past 12 months    Patient is not on Entresto    Patient is not on concurrent ACE    Visit with authorizing provider in past 12 months or upcoming 30 days

## 2024-02-09 ENCOUNTER — OFFICE VISIT (OUTPATIENT)
Dept: CARDIOLOGY | Facility: CLINIC | Age: 74
End: 2024-02-09
Payer: MEDICARE

## 2024-02-09 VITALS
HEIGHT: 69 IN | OXYGEN SATURATION: 99 % | BODY MASS INDEX: 22.51 KG/M2 | HEART RATE: 108 BPM | DIASTOLIC BLOOD PRESSURE: 86 MMHG | WEIGHT: 152 LBS | SYSTOLIC BLOOD PRESSURE: 160 MMHG

## 2024-02-09 DIAGNOSIS — R06.02 SOB (SHORTNESS OF BREATH): ICD-10-CM

## 2024-02-09 DIAGNOSIS — I10 ESSENTIAL HYPERTENSION: ICD-10-CM

## 2024-02-09 DIAGNOSIS — I48.91 ATRIAL FIBRILLATION, UNSPECIFIED TYPE: Primary | ICD-10-CM

## 2024-02-09 PROCEDURE — 1160F RVW MEDS BY RX/DR IN RCRD: CPT | Performed by: PHYSICIAN ASSISTANT

## 2024-02-09 PROCEDURE — 1159F MED LIST DOCD IN RCRD: CPT | Performed by: PHYSICIAN ASSISTANT

## 2024-02-09 PROCEDURE — 99213 OFFICE O/P EST LOW 20 MIN: CPT | Performed by: PHYSICIAN ASSISTANT

## 2024-02-09 NOTE — PROGRESS NOTES
Problem list     Subjective   Katharine Becerra is a 74 y.o. female     Chief Complaint   Patient presents with    Follow-up       HPI    The patient presents to the clinic today for routine evaluation and follow-up.  History includes A-fib and CVA.  She has been treated with rate control and anticoagulation therapy.  She tells me today that she is now doing well on that regimen.  She is recurrently hypertensive with evaluation through the clinic but she tells me that she is mostly normotensive at home.  We have again discussed adjustment of medication regimen which she adamantly refuses.  She tells me today that she is doing well.  She denies chest pain.  She has stable dyspnea.  She has no failure nor dysrhythmic symptoms of significance.  I have recommended consideration for an event monitor at last evaluation.  She was given the event monitor but canceled that as soon as she basically left the office.  She does not want to repeat that or any further testing.  She has no further complaints.  Of note, she does tell me that routine PT/INR's are followed closely with her primary care provider.  She feels that she has been therapeutic.  Current Outpatient Medications on File Prior to Visit   Medication Sig Dispense Refill    cloNIDine (CATAPRES) 0.1 MG tablet Take 1 tablet by mouth twice daily 180 tablet 1    digoxin (LANOXIN) 125 MCG tablet Take 1 tablet by mouth once daily 90 tablet 3    furosemide (LASIX) 40 MG tablet Take 1 tablet by mouth Daily As Needed (for Edema). 90 tablet 3    metoprolol tartrate (LOPRESSOR) 100 MG tablet Take 1 tablet by mouth twice daily 180 tablet 1    olmesartan (BENICAR) 40 MG tablet Take 1 tablet by mouth once daily 90 tablet 0    potassium chloride 10 MEQ CR tablet Take 1 tablet by mouth Daily As Needed (take with Lasix). 90 tablet 3    rosuvastatin (Crestor) 20 MG tablet Take 1 tablet by mouth Daily. 90 tablet 3    warfarin (COUMADIN) 5 MG tablet TAKING 6 MG M/F AND 5 MG ALL OTHER  DAYS  Future refills to PCP. (Patient taking differently: Take 1 tablet by mouth Every Night. TAKING 6 MG M/F AND 5 MG ALL OTHER DAYS  Future refills to PCP.) 30 tablet 0    warfarin (COUMADIN) 6 MG tablet TAKE 1 TABLET BY MOUTH ON MONDAY AND FRIDAYS (Patient not taking: Reported on 2/9/2024) 10 tablet 0    [DISCONTINUED] furosemide (LASIX) 40 MG tablet Take 1 tablet by mouth Daily. 30 tablet 11     No current facility-administered medications on file prior to visit.       Patient has no known allergies.    Past Medical History:   Diagnosis Date    Atrial fibrillation     Cancer     endometrial CA    COVID-19 01/2021    Hypertension     Stroke 01/2021    Tachycardia        Social History     Socioeconomic History    Marital status:    Tobacco Use    Smoking status: Never    Smokeless tobacco: Never   Substance and Sexual Activity    Alcohol use: No    Drug use: No    Sexual activity: Defer       Family History   Problem Relation Age of Onset    Cancer Mother     Heart attack Father        Review of Systems   Constitutional: Negative.  Negative for chills, diaphoresis, fatigue and fever.   HENT: Negative.     Eyes: Negative.  Negative for visual disturbance.   Respiratory: Negative.  Negative for apnea, cough, chest tightness, shortness of breath and wheezing.    Cardiovascular: Negative.  Negative for chest pain, palpitations and leg swelling.   Gastrointestinal: Negative.  Negative for abdominal pain and blood in stool.   Endocrine: Negative.    Genitourinary: Negative.  Negative for hematuria.   Musculoskeletal: Negative.  Negative for arthralgias, back pain, myalgias, neck pain and neck stiffness.   Skin: Negative.  Negative for rash and wound.   Allergic/Immunologic: Negative.  Negative for environmental allergies and food allergies.   Neurological:  Positive for numbness (feet and legs). Negative for dizziness, syncope, weakness, light-headedness and headaches.   Hematological:  Bruises/bleeds easily (on  "coumadin).   Psychiatric/Behavioral: Negative.  Negative for sleep disturbance.        Objective   Vitals:    24 0843   BP: 160/86   Pulse: 108   SpO2: 99%   Weight: 68.9 kg (152 lb)   Height: 175.3 cm (69\")      /86   Pulse 108   Ht 175.3 cm (69\")   Wt 68.9 kg (152 lb)   SpO2 99%   BMI 22.45 kg/m²    Lab Results (most recent)       None          Physical Exam  Vitals and nursing note reviewed.   Constitutional:       General: She is not in acute distress.     Appearance: She is well-developed.   HENT:      Head: Normocephalic and atraumatic.   Eyes:      Conjunctiva/sclera: Conjunctivae normal.      Pupils: Pupils are equal, round, and reactive to light.   Neck:      Vascular: No JVD.      Trachea: No tracheal deviation.   Cardiovascular:      Rate and Rhythm: Normal rate. Rhythm irregular.      Heart sounds: Normal heart sounds.   Pulmonary:      Effort: Pulmonary effort is normal.      Breath sounds: Normal breath sounds.   Abdominal:      General: Bowel sounds are normal. There is no distension.      Palpations: Abdomen is soft. There is no mass.      Tenderness: There is no abdominal tenderness. There is no guarding or rebound.   Musculoskeletal:         General: No tenderness or deformity. Normal range of motion.      Cervical back: Normal range of motion and neck supple.      Right lower le+ Edema present.      Left lower le+ Edema present.   Skin:     General: Skin is warm and dry.      Coloration: Skin is not pale.      Findings: No erythema or rash.   Neurological:      Mental Status: She is alert and oriented to person, place, and time.   Psychiatric:         Behavior: Behavior normal.         Thought Content: Thought content normal.         Judgment: Judgment normal.           Procedure   Procedures       Assessment & Plan      Diagnosis Plan   1. Atrial fibrillation, unspecified type        2. Essential hypertension        3. SOB (shortness of breath)          1.  At this time, " the patient feels that she is doing well from general cardiovascular standpoint.  She is satisfied with medical regimen directed to A-fib.  She wants no adjustments.  She also reports she wants no consideration for rescheduling the event monitor or test otherwise.  She will call if she changes her mind on that.    2.  She reports to me today that blood pressures are typically normotensive at home.  She will monitor that closely and call for any ongoing issues.  Again she wants no adjustments in the medications whatsoever.    3.  In this setting, at the patient's request, nothing further.  We will continue to see her on 6 to 12-month intervals, sooner for complications.    Patient did not bring med list or medicine bottles to appointment, med list has been reviewed and updated based on patient's knowledge of their meds.         Advance Care Planning   ACP discussion was held with the patient during this visit. Patient does not have an advance directive, declines further assistance.           Electronically signed by:

## 2024-04-17 RX ORDER — ROSUVASTATIN CALCIUM 20 MG/1
20 TABLET, COATED ORAL DAILY
Qty: 90 TABLET | Refills: 0 | Status: SHIPPED | OUTPATIENT
Start: 2024-04-17

## 2024-04-20 DIAGNOSIS — I10 ESSENTIAL HYPERTENSION: ICD-10-CM

## 2024-04-22 RX ORDER — OLMESARTAN MEDOXOMIL 40 MG/1
TABLET ORAL
Qty: 90 TABLET | Refills: 0 | Status: SHIPPED | OUTPATIENT
Start: 2024-04-22

## 2024-06-03 DIAGNOSIS — I48.91 ATRIAL FIBRILLATION, UNSPECIFIED TYPE: ICD-10-CM

## 2024-06-03 RX ORDER — METOPROLOL TARTRATE 100 MG/1
TABLET ORAL
Qty: 180 TABLET | Refills: 1 | Status: SHIPPED | OUTPATIENT
Start: 2024-06-03

## 2024-06-03 NOTE — TELEPHONE ENCOUNTER
Caller: Katharine Becerra    Relationship: Self    Best call back number: [phone]    Requested Prescriptions:   Requested Prescriptions     Pending Prescriptions Disp Refills    metoprolol tartrate (LOPRESSOR) 100 MG tablet 180 tablet 1     Sig: Take 1 tablet by mouth twice daily        Pharmacy where request should be sent: [facility] [address] [place] [address] [phone] [facility] [phone] FX     Last office visit with prescribing clinician: 2/9/2024   Last telemedicine visit with prescribing clinician: Visit date not found   Next office visit with prescribing clinician: 10/29/2024     Additional details provided by patient: PATIENT HAS 2 PILLS    Does the patient have less than a 3 day supply:  [x] Yes  [] No    Would you like a call back once the refill request has been completed: [] Yes [x] No    If the office needs to give you a call back, can they leave a voicemail: [] Yes [x] No    Roselyn Templeton   06/03/24 10:41 EDT

## 2024-07-18 DIAGNOSIS — I10 ESSENTIAL HYPERTENSION: ICD-10-CM

## 2024-07-18 RX ORDER — OLMESARTAN MEDOXOMIL 40 MG/1
TABLET ORAL
Qty: 90 TABLET | Refills: 0 | Status: SHIPPED | OUTPATIENT
Start: 2024-07-18

## 2024-07-18 RX ORDER — ROSUVASTATIN CALCIUM 20 MG/1
20 TABLET, COATED ORAL DAILY
Qty: 90 TABLET | Refills: 0 | Status: SHIPPED | OUTPATIENT
Start: 2024-07-18

## 2024-10-18 DIAGNOSIS — I10 ESSENTIAL HYPERTENSION: ICD-10-CM

## 2024-10-18 RX ORDER — OLMESARTAN MEDOXOMIL 40 MG/1
TABLET ORAL
Qty: 90 TABLET | Refills: 0 | Status: SHIPPED | OUTPATIENT
Start: 2024-10-18

## 2024-10-21 RX ORDER — ROSUVASTATIN CALCIUM 20 MG/1
20 TABLET, COATED ORAL DAILY
Qty: 90 TABLET | Refills: 0 | Status: SHIPPED | OUTPATIENT
Start: 2024-10-21

## 2024-10-28 ENCOUNTER — TELEPHONE (OUTPATIENT)
Dept: CARDIOLOGY | Facility: CLINIC | Age: 74
End: 2024-10-28
Payer: MEDICARE

## 2024-11-29 DIAGNOSIS — I48.91 ATRIAL FIBRILLATION, UNSPECIFIED TYPE: ICD-10-CM

## 2024-12-02 RX ORDER — METOPROLOL TARTRATE 100 MG/1
TABLET ORAL
Qty: 180 TABLET | Refills: 0 | Status: SHIPPED | OUTPATIENT
Start: 2024-12-02